# Patient Record
Sex: MALE | Race: WHITE | NOT HISPANIC OR LATINO | Employment: OTHER | URBAN - METROPOLITAN AREA
[De-identification: names, ages, dates, MRNs, and addresses within clinical notes are randomized per-mention and may not be internally consistent; named-entity substitution may affect disease eponyms.]

---

## 2017-01-04 ENCOUNTER — ALLSCRIPTS OFFICE VISIT (OUTPATIENT)
Dept: OTHER | Facility: OTHER | Age: 57
End: 2017-01-04

## 2017-01-12 ENCOUNTER — GENERIC CONVERSION - ENCOUNTER (OUTPATIENT)
Dept: OTHER | Facility: OTHER | Age: 57
End: 2017-01-12

## 2017-02-01 ENCOUNTER — GENERIC CONVERSION - ENCOUNTER (OUTPATIENT)
Dept: OTHER | Facility: OTHER | Age: 57
End: 2017-02-01

## 2017-02-02 ENCOUNTER — ALLSCRIPTS OFFICE VISIT (OUTPATIENT)
Dept: OTHER | Facility: OTHER | Age: 57
End: 2017-02-02

## 2017-02-20 ENCOUNTER — GENERIC CONVERSION - ENCOUNTER (OUTPATIENT)
Dept: OTHER | Facility: OTHER | Age: 57
End: 2017-02-20

## 2017-03-07 ENCOUNTER — ALLSCRIPTS OFFICE VISIT (OUTPATIENT)
Dept: OTHER | Facility: OTHER | Age: 57
End: 2017-03-07

## 2017-04-05 ENCOUNTER — ALLSCRIPTS OFFICE VISIT (OUTPATIENT)
Dept: OTHER | Facility: OTHER | Age: 57
End: 2017-04-05

## 2017-05-23 ENCOUNTER — ALLSCRIPTS OFFICE VISIT (OUTPATIENT)
Dept: OTHER | Facility: OTHER | Age: 57
End: 2017-05-23

## 2017-05-23 ENCOUNTER — TRANSCRIBE ORDERS (OUTPATIENT)
Dept: ADMINISTRATIVE | Facility: HOSPITAL | Age: 57
End: 2017-05-23

## 2017-05-23 DIAGNOSIS — I48.19 PERSISTENT ATRIAL FIBRILLATION (HCC): Primary | ICD-10-CM

## 2017-05-23 DIAGNOSIS — I48.91 ATRIAL FIBRILLATION (HCC): ICD-10-CM

## 2017-05-25 ENCOUNTER — LAB CONVERSION - ENCOUNTER (OUTPATIENT)
Dept: OTHER | Facility: OTHER | Age: 57
End: 2017-05-25

## 2017-05-25 LAB
A/G RATIO (HISTORICAL): 1.7 (CALC) (ref 1–2.5)
ALBUMIN SERPL BCP-MCNC: 4.1 G/DL (ref 3.6–5.1)
ALP SERPL-CCNC: 42 U/L (ref 40–115)
ALT SERPL W P-5'-P-CCNC: 16 U/L (ref 9–46)
AST SERPL W P-5'-P-CCNC: 19 U/L (ref 10–35)
BILIRUB SERPL-MCNC: 1.1 MG/DL (ref 0.2–1.2)
BUN SERPL-MCNC: 17 MG/DL (ref 7–25)
BUN/CREA RATIO (HISTORICAL): NORMAL (CALC) (ref 6–22)
CALCIUM SERPL-MCNC: 8.8 MG/DL (ref 8.6–10.3)
CHLORIDE SERPL-SCNC: 104 MMOL/L (ref 98–110)
CO2 SERPL-SCNC: 30 MMOL/L (ref 20–31)
CREAT SERPL-MCNC: 1.19 MG/DL (ref 0.7–1.33)
DEPRECATED RDW RBC AUTO: 13.9 % (ref 11–15)
EGFR AFRICAN AMERICAN (HISTORICAL): 77 ML/MIN/1.73M2
EGFR-AMERICAN CALC (HISTORICAL): 66 ML/MIN/1.73M2
GAMMA GLOBULIN (HISTORICAL): 2.4 G/DL (CALC) (ref 1.9–3.7)
GLUCOSE (HISTORICAL): 92 MG/DL (ref 65–99)
HCT VFR BLD AUTO: 44.7 % (ref 38.5–50)
HGB BLD-MCNC: 15.4 G/DL (ref 13.2–17.1)
INR PPP: 1
MCH RBC QN AUTO: 31.3 PG (ref 27–33)
MCHC RBC AUTO-ENTMCNC: 34.6 G/DL (ref 32–36)
MCV RBC AUTO: 90.5 FL (ref 80–100)
PLATELET # BLD AUTO: 195 THOUSAND/UL (ref 140–400)
PMV BLD AUTO: 8.6 FL (ref 7.5–12.5)
POTASSIUM SERPL-SCNC: 4.3 MMOL/L (ref 3.5–5.3)
PROTHROMBIN TIME: 10.8 SEC (ref 9–11.5)
RBC # BLD AUTO: 4.94 MILLION/UL (ref 4.2–5.8)
SODIUM SERPL-SCNC: 140 MMOL/L (ref 135–146)
TOTAL PROTEIN (HISTORICAL): 6.5 G/DL (ref 6.1–8.1)
WBC # BLD AUTO: 4.2 THOUSAND/UL (ref 3.8–10.8)

## 2017-05-26 ENCOUNTER — HOSPITAL ENCOUNTER (OUTPATIENT)
Dept: CT IMAGING | Facility: HOSPITAL | Age: 57
Discharge: HOME | End: 2017-05-26
Attending: INTERNAL MEDICINE
Payer: COMMERCIAL

## 2017-05-26 DIAGNOSIS — I48.19 PERSISTENT ATRIAL FIBRILLATION (HCC): ICD-10-CM

## 2017-05-26 PROCEDURE — 75572 CT HRT W/3D IMAGE: CPT

## 2017-05-26 RX ADMIN — IODIXANOL 120 ML: 320 INJECTION, SOLUTION INTRAVASCULAR at 12:58

## 2017-05-31 ENCOUNTER — ANESTHESIA EVENT (OUTPATIENT)
Dept: NON INVASIVE DIAGNOSTICS | Facility: HOSPITAL | Age: 57
End: 2017-05-31
Payer: COMMERCIAL

## 2017-05-31 RX ORDER — PANTOPRAZOLE SODIUM 40 MG/1
40 INJECTION, POWDER, FOR SOLUTION INTRAVENOUS ONCE
Status: CANCELLED | OUTPATIENT
Start: 2017-05-31 | End: 2017-05-31

## 2017-06-01 ENCOUNTER — ANESTHESIA (OUTPATIENT)
Dept: NON INVASIVE DIAGNOSTICS | Facility: HOSPITAL | Age: 57
End: 2017-06-01
Payer: COMMERCIAL

## 2017-06-01 ENCOUNTER — GENERIC CONVERSION - ENCOUNTER (OUTPATIENT)
Dept: OTHER | Facility: OTHER | Age: 57
End: 2017-06-01

## 2017-06-01 ENCOUNTER — ANESTHESIA EVENT (OUTPATIENT)
Dept: SURGERY | Facility: HOSPITAL | Age: 57
End: 2017-06-01
Payer: COMMERCIAL

## 2017-06-01 ENCOUNTER — ANESTHESIA (OUTPATIENT)
Dept: SURGERY | Facility: HOSPITAL | Age: 57
End: 2017-06-01
Payer: COMMERCIAL

## 2017-06-01 ENCOUNTER — HOSPITAL ENCOUNTER (OUTPATIENT)
Dept: NON INVASIVE DIAGNOSTICS | Facility: HOSPITAL | Age: 57
Discharge: HOME | End: 2017-06-01
Attending: INTERNAL MEDICINE | Admitting: INTERNAL MEDICINE
Payer: COMMERCIAL

## 2017-06-01 ENCOUNTER — HOSPITAL ENCOUNTER (OUTPATIENT)
Dept: NON INVASIVE DIAGNOSTICS | Facility: HOSPITAL | Age: 57
Discharge: HOME | End: 2017-06-02
Attending: INTERNAL MEDICINE | Admitting: INTERNAL MEDICINE
Payer: COMMERCIAL

## 2017-06-01 VITALS
HEART RATE: 70 BPM | OXYGEN SATURATION: 94 % | RESPIRATION RATE: 17 BRPM | SYSTOLIC BLOOD PRESSURE: 144 MMHG | HEIGHT: 71 IN | BODY MASS INDEX: 27.44 KG/M2 | TEMPERATURE: 97 F | WEIGHT: 196 LBS | DIASTOLIC BLOOD PRESSURE: 80 MMHG

## 2017-06-01 DIAGNOSIS — I48.91 ATRIAL FIBRILLATION (HCC): ICD-10-CM

## 2017-06-01 LAB
ANION GAP SERPL CALCULATED.3IONS-SCNC: 5 MMOL/L (ref 4–13)
ATRIAL RATE: 51 BPM
BASOPHILS # BLD AUTO: 0.01 THOUSANDS/ΜL (ref 0–0.1)
BASOPHILS NFR BLD AUTO: 0 % (ref 0–1)
BUN SERPL-MCNC: 17 MG/DL (ref 5–25)
CALCIUM SERPL-MCNC: 8.7 MG/DL (ref 8.3–10.1)
CHLORIDE SERPL-SCNC: 104 MMOL/L (ref 100–108)
CO2 SERPL-SCNC: 30 MMOL/L (ref 21–32)
CREAT SERPL-MCNC: 1.15 MG/DL (ref 0.6–1.3)
EOSINOPHIL # BLD AUTO: 0.19 THOUSAND/ΜL (ref 0–0.61)
EOSINOPHIL NFR BLD AUTO: 6 % (ref 0–6)
ERYTHROCYTE [DISTWIDTH] IN BLOOD BY AUTOMATED COUNT: 13 % (ref 11.6–15.1)
GFR SERPL CREATININE-BSD FRML MDRD: >60 ML/MIN/1.73SQ M
GLUCOSE P FAST SERPL-MCNC: 96 MG/DL (ref 65–99)
GLUCOSE SERPL-MCNC: 96 MG/DL (ref 65–140)
HCT VFR BLD AUTO: 44.5 % (ref 36.5–49.3)
HGB BLD-MCNC: 15.1 G/DL (ref 12–17)
KCT BLD-ACNC: 166 SEC (ref 89–137)
KCT BLD-ACNC: 242 SEC (ref 89–137)
KCT BLD-ACNC: 278 SEC (ref 89–137)
KCT BLD-ACNC: 285 SEC (ref 89–137)
KCT BLD-ACNC: 297 SEC (ref 89–137)
KCT BLD-ACNC: 322 SEC (ref 89–137)
KCT BLD-ACNC: 335 SEC (ref 89–137)
LYMPHOCYTES # BLD AUTO: 0.95 THOUSANDS/ΜL (ref 0.6–4.47)
LYMPHOCYTES NFR BLD AUTO: 27 % (ref 14–44)
MAGNESIUM SERPL-MCNC: 2.2 MG/DL (ref 1.6–2.6)
MCH RBC QN AUTO: 31.1 PG (ref 26.8–34.3)
MCHC RBC AUTO-ENTMCNC: 33.9 G/DL (ref 31.4–37.4)
MCV RBC AUTO: 92 FL (ref 82–98)
MONOCYTES # BLD AUTO: 0.31 THOUSAND/ΜL (ref 0.17–1.22)
MONOCYTES NFR BLD AUTO: 9 % (ref 4–12)
NEUTROPHILS # BLD AUTO: 2.01 THOUSANDS/ΜL (ref 1.85–7.62)
NEUTS SEG NFR BLD AUTO: 58 % (ref 43–75)
NRBC BLD AUTO-RTO: 0 /100 WBCS
P AXIS: 9 DEGREES
PLATELET # BLD AUTO: 187 THOUSANDS/UL (ref 149–390)
PMV BLD AUTO: 10.4 FL (ref 8.9–12.7)
POTASSIUM SERPL-SCNC: 4.4 MMOL/L (ref 3.5–5.3)
PR INTERVAL: 160 MS
QRS AXIS: 20 DEGREES
QRSD INTERVAL: 98 MS
QT INTERVAL: 434 MS
QTC INTERVAL: 400 MS
RBC # BLD AUTO: 4.85 MILLION/UL (ref 3.88–5.62)
SODIUM SERPL-SCNC: 139 MMOL/L (ref 136–145)
SPECIMEN SOURCE: ABNORMAL
T WAVE AXIS: -5 DEGREES
VENTRICULAR RATE: 51 BPM
WBC # BLD AUTO: 3.47 THOUSAND/UL (ref 4.31–10.16)

## 2017-06-01 PROCEDURE — 93312 ECHO TRANSESOPHAGEAL: CPT

## 2017-06-01 PROCEDURE — C1730 CATH, EP, 19 OR FEW ELECT: HCPCS | Performed by: INTERNAL MEDICINE

## 2017-06-01 PROCEDURE — 85025 COMPLETE CBC W/AUTO DIFF WBC: CPT | Performed by: PHYSICIAN ASSISTANT

## 2017-06-01 PROCEDURE — C1893 INTRO/SHEATH, FIXED,NON-PEEL: HCPCS | Performed by: INTERNAL MEDICINE

## 2017-06-01 PROCEDURE — 80048 BASIC METABOLIC PNL TOTAL CA: CPT | Performed by: PHYSICIAN ASSISTANT

## 2017-06-01 PROCEDURE — C1769 GUIDE WIRE: HCPCS | Performed by: INTERNAL MEDICINE

## 2017-06-01 PROCEDURE — 83735 ASSAY OF MAGNESIUM: CPT | Performed by: PHYSICIAN ASSISTANT

## 2017-06-01 PROCEDURE — C1894 INTRO/SHEATH, NON-LASER: HCPCS | Performed by: INTERNAL MEDICINE

## 2017-06-01 PROCEDURE — 93656 COMPRE EP EVAL ABLTJ ATR FIB: CPT | Performed by: INTERNAL MEDICINE

## 2017-06-01 PROCEDURE — 93005 ELECTROCARDIOGRAM TRACING: CPT | Performed by: PHYSICIAN ASSISTANT

## 2017-06-01 PROCEDURE — 93623 PRGRMD STIMJ&PACG IV RX NFS: CPT | Performed by: INTERNAL MEDICINE

## 2017-06-01 PROCEDURE — 93662 INTRACARDIAC ECG (ICE): CPT | Performed by: INTERNAL MEDICINE

## 2017-06-01 PROCEDURE — C9113 INJ PANTOPRAZOLE SODIUM, VIA: HCPCS | Performed by: PHYSICIAN ASSISTANT

## 2017-06-01 PROCEDURE — C1759 CATH, INTRA ECHOCARDIOGRAPHY: HCPCS | Performed by: INTERNAL MEDICINE

## 2017-06-01 PROCEDURE — 93655 ICAR CATH ABLTJ DSCRT ARRHYT: CPT | Performed by: INTERNAL MEDICINE

## 2017-06-01 PROCEDURE — C2630 CATH, EP, COOL-TIP: HCPCS | Performed by: INTERNAL MEDICINE

## 2017-06-01 PROCEDURE — 85347 COAGULATION TIME ACTIVATED: CPT

## 2017-06-01 PROCEDURE — 93613 INTRACARDIAC EPHYS 3D MAPG: CPT | Performed by: INTERNAL MEDICINE

## 2017-06-01 PROCEDURE — C1726 CATH, BAL DIL, NON-VASCULAR: HCPCS | Performed by: INTERNAL MEDICINE

## 2017-06-01 RX ORDER — SODIUM CHLORIDE 9 MG/ML
75 INJECTION, SOLUTION INTRAVENOUS CONTINUOUS
Status: DISCONTINUED | OUTPATIENT
Start: 2017-06-01 | End: 2017-06-02 | Stop reason: HOSPADM

## 2017-06-01 RX ORDER — SODIUM CHLORIDE, SODIUM LACTATE, POTASSIUM CHLORIDE, CALCIUM CHLORIDE 600; 310; 30; 20 MG/100ML; MG/100ML; MG/100ML; MG/100ML
20 INJECTION, SOLUTION INTRAVENOUS CONTINUOUS
Status: CANCELLED | OUTPATIENT
Start: 2017-06-01

## 2017-06-01 RX ORDER — ZINC GLUCONATE 50 MG
25 TABLET ORAL DAILY
COMMUNITY
End: 2018-08-10 | Stop reason: ALTCHOICE

## 2017-06-01 RX ORDER — SODIUM CHLORIDE, SODIUM LACTATE, POTASSIUM CHLORIDE, CALCIUM CHLORIDE 600; 310; 30; 20 MG/100ML; MG/100ML; MG/100ML; MG/100ML
20 INJECTION, SOLUTION INTRAVENOUS CONTINUOUS
Status: DISCONTINUED | OUTPATIENT
Start: 2017-06-01 | End: 2017-06-02 | Stop reason: HOSPADM

## 2017-06-01 RX ORDER — ASPIRIN 81 MG/1
162 TABLET ORAL DAILY
Status: DISCONTINUED | OUTPATIENT
Start: 2017-06-02 | End: 2017-06-02 | Stop reason: HOSPADM

## 2017-06-01 RX ORDER — SODIUM CHLORIDE 9 MG/ML
INJECTION, SOLUTION INTRAVENOUS CONTINUOUS PRN
Status: DISCONTINUED | OUTPATIENT
Start: 2017-06-01 | End: 2017-06-01 | Stop reason: SURG

## 2017-06-01 RX ORDER — EPHEDRINE SULFATE 50 MG/ML
INJECTION, SOLUTION INTRAVENOUS AS NEEDED
Status: DISCONTINUED | OUTPATIENT
Start: 2017-06-01 | End: 2017-06-01 | Stop reason: SURG

## 2017-06-01 RX ORDER — PANTOPRAZOLE SODIUM 40 MG/1
40 TABLET, DELAYED RELEASE ORAL
Status: DISCONTINUED | OUTPATIENT
Start: 2017-06-02 | End: 2017-06-02 | Stop reason: HOSPADM

## 2017-06-01 RX ORDER — ONDANSETRON 2 MG/ML
4 INJECTION INTRAMUSCULAR; INTRAVENOUS ONCE
Status: DISCONTINUED | OUTPATIENT
Start: 2017-06-01 | End: 2017-06-01 | Stop reason: HOSPADM

## 2017-06-01 RX ORDER — HEPARIN SODIUM 1000 [USP'U]/ML
INJECTION, SOLUTION INTRAVENOUS; SUBCUTANEOUS CODE/TRAUMA/SEDATION MEDICATION
Status: COMPLETED | OUTPATIENT
Start: 2017-06-01 | End: 2017-06-01

## 2017-06-01 RX ORDER — PROTAMINE SULFATE 10 MG/ML
INJECTION, SOLUTION INTRAVENOUS AS NEEDED
Status: DISCONTINUED | OUTPATIENT
Start: 2017-06-01 | End: 2017-06-01 | Stop reason: SURG

## 2017-06-01 RX ORDER — ATROPINE SULFATE 0.4 MG/ML
AMPUL (ML) INJECTION AS NEEDED
Status: DISCONTINUED | OUTPATIENT
Start: 2017-06-01 | End: 2017-06-01 | Stop reason: SURG

## 2017-06-01 RX ORDER — LIDOCAINE HYDROCHLORIDE 10 MG/ML
INJECTION, SOLUTION INFILTRATION; PERINEURAL AS NEEDED
Status: DISCONTINUED | OUTPATIENT
Start: 2017-06-01 | End: 2017-06-01 | Stop reason: SURG

## 2017-06-01 RX ORDER — FENTANYL CITRATE 50 UG/ML
INJECTION, SOLUTION INTRAMUSCULAR; INTRAVENOUS AS NEEDED
Status: DISCONTINUED | OUTPATIENT
Start: 2017-06-01 | End: 2017-06-01 | Stop reason: SURG

## 2017-06-01 RX ORDER — PANTOPRAZOLE SODIUM 40 MG/1
40 INJECTION, POWDER, FOR SOLUTION INTRAVENOUS ONCE
Status: COMPLETED | OUTPATIENT
Start: 2017-06-01 | End: 2017-06-01

## 2017-06-01 RX ORDER — ADENOSINE 3 MG/ML
INJECTION INTRAVENOUS CODE/TRAUMA/SEDATION MEDICATION
Status: COMPLETED | OUTPATIENT
Start: 2017-06-01 | End: 2017-06-01

## 2017-06-01 RX ORDER — MULTIVIT-MIN/FA/LYCOPEN/LUTEIN .4-300-25
1 TABLET ORAL
COMMUNITY

## 2017-06-01 RX ORDER — HEPARIN SODIUM 10000 [USP'U]/100ML
INJECTION, SOLUTION INTRAVENOUS
Status: COMPLETED | OUTPATIENT
Start: 2017-06-01 | End: 2017-06-01

## 2017-06-01 RX ORDER — MIDAZOLAM HYDROCHLORIDE 1 MG/ML
INJECTION INTRAMUSCULAR; INTRAVENOUS AS NEEDED
Status: DISCONTINUED | OUTPATIENT
Start: 2017-06-01 | End: 2017-06-01 | Stop reason: SURG

## 2017-06-01 RX ORDER — DABIGATRAN ETEXILATE 150 MG/1
150 CAPSULE, COATED PELLETS ORAL 2 TIMES DAILY
COMMUNITY
End: 2018-08-10 | Stop reason: ALTCHOICE

## 2017-06-01 RX ORDER — FENTANYL CITRATE/PF 50 MCG/ML
25 SYRINGE (ML) INJECTION
Status: CANCELLED | OUTPATIENT
Start: 2017-06-01

## 2017-06-01 RX ORDER — ACETAMINOPHEN 325 MG/1
650 TABLET ORAL EVERY 4 HOURS PRN
Status: DISCONTINUED | OUTPATIENT
Start: 2017-06-01 | End: 2017-06-02 | Stop reason: HOSPADM

## 2017-06-01 RX ORDER — DABIGATRAN ETEXILATE 150 MG/1
150 CAPSULE, COATED PELLETS ORAL 2 TIMES DAILY
Status: DISCONTINUED | OUTPATIENT
Start: 2017-06-01 | End: 2017-06-02 | Stop reason: HOSPADM

## 2017-06-01 RX ORDER — ONDANSETRON 2 MG/ML
4 INJECTION INTRAMUSCULAR; INTRAVENOUS ONCE
Status: CANCELLED | OUTPATIENT
Start: 2017-06-01 | End: 2017-06-01

## 2017-06-01 RX ORDER — FENTANYL CITRATE/PF 50 MCG/ML
50 SYRINGE (ML) INJECTION
Status: DISCONTINUED | OUTPATIENT
Start: 2017-06-01 | End: 2017-06-01 | Stop reason: HOSPADM

## 2017-06-01 RX ORDER — PROPOFOL 10 MG/ML
INJECTION, EMULSION INTRAVENOUS AS NEEDED
Status: DISCONTINUED | OUTPATIENT
Start: 2017-06-01 | End: 2017-06-01 | Stop reason: SURG

## 2017-06-01 RX ORDER — ROCURONIUM BROMIDE 10 MG/ML
INJECTION, SOLUTION INTRAVENOUS AS NEEDED
Status: DISCONTINUED | OUTPATIENT
Start: 2017-06-01 | End: 2017-06-01 | Stop reason: SURG

## 2017-06-01 RX ORDER — FENTANYL CITRATE/PF 50 MCG/ML
25 SYRINGE (ML) INJECTION
Status: DISCONTINUED | OUTPATIENT
Start: 2017-06-01 | End: 2017-06-01 | Stop reason: HOSPADM

## 2017-06-01 RX ADMIN — Medication 1 TABLET: at 17:02

## 2017-06-01 RX ADMIN — HEPARIN SODIUM 4000 UNITS: 1000 INJECTION INTRAVENOUS; SUBCUTANEOUS at 09:54

## 2017-06-01 RX ADMIN — ROCURONIUM BROMIDE 40 MG: 10 INJECTION, SOLUTION INTRAVENOUS at 07:56

## 2017-06-01 RX ADMIN — PROTAMINE SULFATE 45 MG: 10 INJECTION, SOLUTION INTRAVENOUS at 11:06

## 2017-06-01 RX ADMIN — FENTANYL CITRATE 100 MCG: 50 INJECTION, SOLUTION INTRAMUSCULAR; INTRAVENOUS at 07:55

## 2017-06-01 RX ADMIN — IOHEXOL 30 ML: 350 INJECTION, SOLUTION INTRAVENOUS at 11:07

## 2017-06-01 RX ADMIN — MIDAZOLAM HYDROCHLORIDE 2 MG: 1 INJECTION, SOLUTION INTRAMUSCULAR; INTRAVENOUS at 07:46

## 2017-06-01 RX ADMIN — HEPARIN SODIUM 2000 UNITS: 1000 INJECTION INTRAVENOUS; SUBCUTANEOUS at 09:34

## 2017-06-01 RX ADMIN — DEXTROSE 0.5 MCG/MIN: 5 SOLUTION INTRAVENOUS at 09:28

## 2017-06-01 RX ADMIN — PANTOPRAZOLE SODIUM 40 MG: 40 INJECTION, POWDER, FOR SOLUTION INTRAVENOUS at 09:49

## 2017-06-01 RX ADMIN — SODIUM CHLORIDE: 0.9 INJECTION, SOLUTION INTRAVENOUS at 07:43

## 2017-06-01 RX ADMIN — ADENOSINE 18 MG: 3 INJECTION, SOLUTION INTRAVENOUS at 10:56

## 2017-06-01 RX ADMIN — LIDOCAINE HYDROCHLORIDE 50 MG: 10 INJECTION, SOLUTION INFILTRATION; PERINEURAL at 07:55

## 2017-06-01 RX ADMIN — HEPARIN SODIUM 5000 UNITS: 1000 INJECTION INTRAVENOUS; SUBCUTANEOUS at 09:05

## 2017-06-01 RX ADMIN — DABIGATRAN ETEXILATE MESYLATE 150 MG: 150 CAPSULE ORAL at 18:15

## 2017-06-01 RX ADMIN — HEPARIN SODIUM 9000 UNITS: 1000 INJECTION INTRAVENOUS; SUBCUTANEOUS at 08:45

## 2017-06-01 RX ADMIN — EPHEDRINE SULFATE 10 MG: 50 INJECTION, SOLUTION INTRAMUSCULAR; INTRAVENOUS; SUBCUTANEOUS at 09:27

## 2017-06-01 RX ADMIN — DEXAMETHASONE SODIUM PHOSPHATE 10 MG: 10 INJECTION INTRAMUSCULAR; INTRAVENOUS at 08:40

## 2017-06-01 RX ADMIN — ATROPINE SULFATE 0.4 MG: 0.4 INJECTION, SOLUTION INTRAMUSCULAR; INTRAVENOUS; SUBCUTANEOUS at 09:28

## 2017-06-01 RX ADMIN — HEPARIN SODIUM 2500 UNITS/HR: 10000 INJECTION, SOLUTION INTRAVENOUS at 08:47

## 2017-06-01 RX ADMIN — PROPOFOL 200 MG: 10 INJECTION, EMULSION INTRAVENOUS at 07:55

## 2017-06-01 RX ADMIN — SODIUM CHLORIDE: 0.9 INJECTION, SOLUTION INTRAVENOUS at 08:02

## 2017-06-02 VITALS
RESPIRATION RATE: 17 BRPM | OXYGEN SATURATION: 97 % | BODY MASS INDEX: 27.44 KG/M2 | TEMPERATURE: 98.2 F | SYSTOLIC BLOOD PRESSURE: 141 MMHG | WEIGHT: 195.99 LBS | HEIGHT: 71 IN | HEART RATE: 64 BPM | DIASTOLIC BLOOD PRESSURE: 84 MMHG

## 2017-06-02 LAB
ANION GAP SERPL CALCULATED.3IONS-SCNC: 6 MMOL/L (ref 4–13)
BUN SERPL-MCNC: 14 MG/DL (ref 5–25)
CALCIUM SERPL-MCNC: 8.6 MG/DL (ref 8.3–10.1)
CHLORIDE SERPL-SCNC: 104 MMOL/L (ref 100–108)
CO2 SERPL-SCNC: 28 MMOL/L (ref 21–32)
CREAT SERPL-MCNC: 1.05 MG/DL (ref 0.6–1.3)
ERYTHROCYTE [DISTWIDTH] IN BLOOD BY AUTOMATED COUNT: 13 % (ref 11.6–15.1)
GFR SERPL CREATININE-BSD FRML MDRD: >60 ML/MIN/1.73SQ M
GLUCOSE SERPL-MCNC: 103 MG/DL (ref 65–140)
HCT VFR BLD AUTO: 41.2 % (ref 36.5–49.3)
HGB BLD-MCNC: 13.9 G/DL (ref 12–17)
MAGNESIUM SERPL-MCNC: 2.1 MG/DL (ref 1.6–2.6)
MCH RBC QN AUTO: 31.2 PG (ref 26.8–34.3)
MCHC RBC AUTO-ENTMCNC: 33.7 G/DL (ref 31.4–37.4)
MCV RBC AUTO: 93 FL (ref 82–98)
PLATELET # BLD AUTO: 175 THOUSANDS/UL (ref 149–390)
PMV BLD AUTO: 10.5 FL (ref 8.9–12.7)
POTASSIUM SERPL-SCNC: 4.2 MMOL/L (ref 3.5–5.3)
RBC # BLD AUTO: 4.45 MILLION/UL (ref 3.88–5.62)
SODIUM SERPL-SCNC: 138 MMOL/L (ref 136–145)
WBC # BLD AUTO: 8.13 THOUSAND/UL (ref 4.31–10.16)

## 2017-06-02 PROCEDURE — 85027 COMPLETE CBC AUTOMATED: CPT | Performed by: PHYSICIAN ASSISTANT

## 2017-06-02 PROCEDURE — 80048 BASIC METABOLIC PNL TOTAL CA: CPT | Performed by: PHYSICIAN ASSISTANT

## 2017-06-02 PROCEDURE — 83735 ASSAY OF MAGNESIUM: CPT | Performed by: PHYSICIAN ASSISTANT

## 2017-06-02 RX ORDER — PANTOPRAZOLE SODIUM 40 MG/1
40 TABLET, DELAYED RELEASE ORAL
Qty: 30 TABLET | Refills: 0 | Status: SHIPPED | OUTPATIENT
Start: 2017-06-02 | End: 2018-08-10 | Stop reason: ALTCHOICE

## 2017-06-02 RX ADMIN — PANTOPRAZOLE SODIUM 40 MG: 40 TABLET, DELAYED RELEASE ORAL at 05:16

## 2017-06-02 RX ADMIN — ASPIRIN 162 MG: 81 TABLET ORAL at 09:13

## 2017-06-02 RX ADMIN — Medication 1 TABLET: at 09:13

## 2017-06-02 RX ADMIN — DABIGATRAN ETEXILATE MESYLATE 150 MG: 150 CAPSULE ORAL at 10:18

## 2017-06-02 RX ADMIN — BENAZEPRIL HYDROCHLORIDE: 10 TABLET ORAL at 09:13

## 2017-06-19 ENCOUNTER — ALLSCRIPTS OFFICE VISIT (OUTPATIENT)
Dept: OTHER | Facility: OTHER | Age: 57
End: 2017-06-19

## 2017-07-06 ENCOUNTER — ALLSCRIPTS OFFICE VISIT (OUTPATIENT)
Dept: OTHER | Facility: OTHER | Age: 57
End: 2017-07-06

## 2017-07-27 ENCOUNTER — GENERIC CONVERSION - ENCOUNTER (OUTPATIENT)
Dept: OTHER | Facility: OTHER | Age: 57
End: 2017-07-27

## 2017-07-28 ENCOUNTER — GENERIC CONVERSION - ENCOUNTER (OUTPATIENT)
Dept: OTHER | Facility: OTHER | Age: 57
End: 2017-07-28

## 2017-08-07 ENCOUNTER — ALLSCRIPTS OFFICE VISIT (OUTPATIENT)
Dept: OTHER | Facility: OTHER | Age: 57
End: 2017-08-07

## 2017-08-14 ENCOUNTER — GENERIC CONVERSION - ENCOUNTER (OUTPATIENT)
Dept: OTHER | Facility: OTHER | Age: 57
End: 2017-08-14

## 2017-09-08 ENCOUNTER — ALLSCRIPTS OFFICE VISIT (OUTPATIENT)
Dept: OTHER | Facility: OTHER | Age: 57
End: 2017-09-08

## 2017-09-08 DIAGNOSIS — Z12.5 ENCOUNTER FOR SCREENING FOR MALIGNANT NEOPLASM OF PROSTATE: ICD-10-CM

## 2017-09-08 DIAGNOSIS — F52.21 MALE ERECTILE DISORDER (CODE): ICD-10-CM

## 2017-09-08 DIAGNOSIS — Z00.00 ENCOUNTER FOR GENERAL ADULT MEDICAL EXAMINATION WITHOUT ABNORMAL FINDINGS: ICD-10-CM

## 2017-09-08 DIAGNOSIS — I10 ESSENTIAL (PRIMARY) HYPERTENSION: ICD-10-CM

## 2017-09-14 ENCOUNTER — LAB CONVERSION - ENCOUNTER (OUTPATIENT)
Dept: OTHER | Facility: OTHER | Age: 57
End: 2017-09-14

## 2017-09-14 ENCOUNTER — GENERIC CONVERSION - ENCOUNTER (OUTPATIENT)
Dept: OTHER | Facility: OTHER | Age: 57
End: 2017-09-14

## 2017-09-14 LAB
A/G RATIO (HISTORICAL): 1.8 (CALC) (ref 1–2.5)
ALBUMIN SERPL BCP-MCNC: 4.2 G/DL (ref 3.6–5.1)
ALP SERPL-CCNC: 42 U/L (ref 40–115)
ALT SERPL W P-5'-P-CCNC: 15 U/L (ref 9–46)
AST SERPL W P-5'-P-CCNC: 23 U/L (ref 10–35)
BILIRUB SERPL-MCNC: 1 MG/DL (ref 0.2–1.2)
BUN SERPL-MCNC: 17 MG/DL (ref 7–25)
BUN/CREA RATIO (HISTORICAL): NORMAL (CALC) (ref 6–22)
CALCIUM SERPL-MCNC: 9.1 MG/DL (ref 8.6–10.3)
CHLORIDE SERPL-SCNC: 102 MMOL/L (ref 98–110)
CHOLEST SERPL-MCNC: 187 MG/DL
CHOLEST/HDLC SERPL: 3.5 (CALC)
CO2 SERPL-SCNC: 28 MMOL/L (ref 20–31)
CREAT SERPL-MCNC: 1.14 MG/DL (ref 0.7–1.33)
EGFR AFRICAN AMERICAN (HISTORICAL): 81 ML/MIN/1.73M2
EGFR-AMERICAN CALC (HISTORICAL): 70 ML/MIN/1.73M2
GAMMA GLOBULIN (HISTORICAL): 2.4 G/DL (CALC) (ref 1.9–3.7)
GLUCOSE (HISTORICAL): 87 MG/DL (ref 65–99)
HBA1C MFR BLD HPLC: 5.3 % OF TOTAL HGB
HDLC SERPL-MCNC: 54 MG/DL
HEPATITIS C ANTIBODY (HISTORICAL): NORMAL
LDL CHOLESTEROL (HISTORICAL): 115 MG/DL (CALC)
NON-HDL-CHOL (CHOL-HDL) (HISTORICAL): 133 MG/DL (CALC)
POTASSIUM SERPL-SCNC: 4.1 MMOL/L (ref 3.5–5.3)
PROSTATE SPECIFIC ANTIGEN TOTAL (HISTORICAL): 0.6 NG/ML
SIGNAL TO CUT-OFF (HISTORICAL): 0.01
SODIUM SERPL-SCNC: 137 MMOL/L (ref 135–146)
TESTOSTERONE FREE (HISTORICAL): 93.6 PG/ML (ref 35–155)
TESTOSTERONE TOTAL (HISTORICAL): 650 NG/DL (ref 250–1100)
TOTAL PROTEIN (HISTORICAL): 6.6 G/DL (ref 6.1–8.1)
TRIGL SERPL-MCNC: 83 MG/DL

## 2017-09-25 ENCOUNTER — GENERIC CONVERSION - ENCOUNTER (OUTPATIENT)
Dept: OTHER | Facility: OTHER | Age: 57
End: 2017-09-25

## 2017-10-19 ENCOUNTER — ALLSCRIPTS OFFICE VISIT (OUTPATIENT)
Dept: OTHER | Facility: OTHER | Age: 57
End: 2017-10-19

## 2017-10-26 NOTE — PROGRESS NOTES
Assessment  Assessed    1  Atrial fibrillation (427 31) (I48 91)   2  Benign essential hypertension (401 1) (I10)   3  Chest discomfort (786 59) (R01 89)    Plan  Atrial fibrillation    · Pradaxa 150 MG Oral Capsule   Rx By: Zainab Mohr; Dispense: 0 Days ; #: Sufficient Capsule; Refill: 0;For: Atrial fibrillation; EDISON = N; Record; Last Updated By: Saumya Alcantar; 9/8/2017 4:59:03 PM  Atrial fibrillation, Typical atrial flutter    · EKG/ECG- POC; Status:Complete;   Done: 03MGU8086   Perform: In Office; [phone]; Last Updated By:Candelaria Serrano; 9/8/2017 4:40:37 PM;Ordered; For:Atrial fibrillation, Typical atrial flutter; Ordered By:Jessica Padron;    Discussion/Summary  Cardiology Discussion Summary Free Text Note Form [facility]:   yo maleS/p successful ab  Great exercise tolerance  Zero afib episodes since June (0% now)> He was at 21%- up to 27 9% prior to ablation and in typical flutterAnticoagualted on Pradaxa post ablation [id number]=3  HTN well controlled on amlodipine  CHest pressue during long hike on 8/12/17  Since then has done exercise w/o limitations  No Q waves on EKGNarrow complex rhythm 188bpm on loop may represent SVT vs SInus tach 8/12/17 but was at 4:30pm not related to his chest pain episdoe in morning  He was asymtpomatic at that time  Cannot r/o SVT vs sinus tach, but I did not see P waves on loop  We did not induce SVT during ablation  D/c Pradaxaf/u in 12 monthsforward to Dr Velasquez [place] regarding the Chest pressure if he thinks imaging worth while  in 3 months  Chief Complaint  Chief Complaint Free Text Note Form: f/u afib      History of Present Illness  Cardiology HPI Free Text Note [facility]: yo maleS/p successful ab  Great exercise tolerance  Zero afib episodes since June (0% now)> He was at 21%- up to 27 9% prior to ablation and in typical flutterAnticoagualted on Pradaxa post ablation [id number]=2  HTN well controlled on amlodipine   CHest pressue during long hike on 8/12/17  Since then has done exercise w/o limitations  No Q waves on EKGNarrow complex rhythm 188bpm on loop may represent SVT vs SInus tach 8/12/17 but was at 4:30pm not related to his chest pain episdoe in morning  He was asymtpomatic at that time  Cannot r/o SVT vs sinus tach, but I did not see P waves on loop  We did not induce SVT during ablation  point ROS reviewed in paper chart       Review of Systems  Cardiology Male ROS:     Cardiac: as noted in HPI  Skin: No complaints of nonhealing sores or skin rash  Genitourinary: No complaints of recurrent urinary tract infections, frequent urination at night, difficult urination, blood in urine, kidney stones, loss of bladder control, no kidney or prostate problems, no erectile dysfunction  Psychological: No complaints of feeling depressed, anxiety, panic attacks, or difficulty concentrating  General: No complaints of trouble sleeping, lack of energy, fatigue, appetite changes, weight changes, fever, frequent infections, or night sweats  Respiratory: No complaints of shortness of breath, cough with sputum, or wheezing  HEENT: No complaints of serious problems, hearing problems, nose problems, throat problems, or snoring  Gastrointestinal: No complaints of liver problems, nausea, vomiting, heartburn, constipation, bloody stools, diarrhea, problems swallowing, adbominal pain, or rectal bleeding  Hematologic: No complaints of bleeding disorders, anemia, blood clots, or excessive brusing  Neurological: No complaints of numbness, tingling, dizziness, weakness, seizures, headaches, syncope or fainting, AM fatigue, daytime sleepiness, no witnessed apnea episodes  Musculoskeletal: No complaints of arthritis, back pain, or painfull swelling  Active Problems  Problems    1  Abnormal lung sounds (786 7) (R09 89)   2  Atrial fibrillation (427 31) (I48 91)   3  Benign essential hypertension (401 1) (I10)   4  Catheter Ablation Atrial Fibrillation   5  Catheter Ablation Atrial Flutter   6  Encounter for prostate cancer screening (V76 44) (Z12 5)   7  Erectile dysfunction of non-organic origin (302 72) (F52 21)   8  Neuritis (729 2) (M79 2)   9  Onychomycosis (110 1) (B35 1)   10  Paroxysmal atrial fibrillation (427 31) (I48 0)   11  Typical atrial flutter (427 32) (I48 3)    Past Medical History  Problems    1  History of hypertension (V12 59) (Z86 79)   2  History of irregular heartbeat (V12 59) (Z86 79)   3  History of Overweight (278 02) (E66 3)  Active Problems And Past Medical History Reviewed: The active problems and past medical history were reviewed and updated today  Surgical History  Problems    1  History of Inguinal Hernia Repair   2  History of Oral Surgery Tooth Extraction   3  History of Tonsillectomy  Surgical History Reviewed: The surgical history was reviewed and updated today  Family History  Father    1  Family history of Heart Disease (V17 49)  Family History    2  Family history of Family Health Status Of Father -    3  Family history of Family Health Status Of Mother - Alive  Family History Reviewed: The family history was reviewed and updated today  Social History  Problems    · Being A Social Drinker   · Denied: History of Drug Use   · Has 3 children   ·    · Never a smoker   · Non smoker / tobacco user (V49 89) (Z78 9)  Social History Reviewed: The social history was reviewed and updated today  Current Meds   1  Amlodipine Besy-Benazepril HCl - 5-10 MG Oral Capsule; TAKE ONE CAPSULE BY   MOUTH EVERY DAY; Therapy: 54JOG3469 to (Evaluate:07Dnl5607)  Requested for: 09Bdz3002; Last   Rx:[id number] Ordered   2  Multi-Vitamin Oral Tablet; TAKE 1 TABLET DAILY; Therapy: (Recorded:2017) to Recorded   3  Pradaxa 150 MG Oral Capsule; TAKE 1 CAPSULE TWICE DAILY; Therapy: 37CHX2415 to Recorded   4  PreserVision AREDS 2 CAPS; take 1 capsule daily; Therapy: (Recorded:2017) to Recorded   5   Soya Lecithin 1200 MG Oral Capsule; take 1 capsule daily; Therapy: 09DTB2177 to Recorded  Medication List Reviewed: The medication list was reviewed and updated today  Allergies  Medication    1  No Known Drug Allergies  Non-Medication    2  No Known Environmental Allergies   3  No Known Food Allergies    Vitals  Vital Signs    Recorded: 08Sep2017 04:39PM   Heart Rate 73   Systolic 019, RUE, Sitting   Diastolic 76, RUE, Sitting   Height 6 ft    Weight 190 lb 9 oz   BMI Calculated 25 85   BSA Calculated 2 09     Physical Exam    Constitutional - General appearance: No acute distress, well appearing and well nourished  Eyes - Conjunctiva and Sclera examination: Conjunctiva pink, sclera anicteric  Ears, Nose, Mouth, and Throat - External inspection of ears and nose: Normal without deformities or discharge  Neck - Normal, no JVD   Pulmonary - Respiratory effort: No signs of respiratory distress  -- Auscultation of lungs: Clear to auscultation  Cardiovascular - Auscultation of heart: Normal rate and rhythm, normal S1 and S2, no murmurs  -- Pedal pulses: Normal, 2+ bilaterally  -- Examination of extremities for edema and/or varicosities: Normal     Chest - Chest: Normal    Musculoskeletal - Gait and station: Normal gait  Skin - Skin: Normal without rashes  Skin is warm and well perfused  Neurologic - Speech normal  No focal deficits  Psychiatric - Orientation to person, place, and time: Normal    Additional Findings - loop recoder site c/d/i  Results/Data  Diagnostic Studies Reviewed Cardio:   Holter/Event Recorder: I interogated loop today  No afib eipsodes since June 2017  One SVT episode 8/12/17 see discussion  ECG Report: today nsr 71bpmvisit: Typical aflutter w RVR 133bpm 2:1EKGnormal EKG today except possible LAE  EKG from yesterday which showed rate controlled afib        Future Appointments    Date/Time Provider Specialty Site   10/18/2017 08:30 AM Cardiology, Device Remote  [facility] [address]   01/23/2018 09:30 AM Cardiology, Device Remote   [address]   04/24/2018 09:00 AM Cardiology, Device Remote   [address]     Signatures   Electronically signed by : NIURKA Carranza ; Sep  8 2017  5:12PM EST                       (Author)

## 2018-01-10 NOTE — RESULT NOTES
Message   Notify the patient normal lipid panel follow-up as scheduled   Notify the patient normal comprehensive metabolic panel follow up as scheduled        Verified Results  (1) LIPID PANEL, FASTING [id number] 07:12AM Lurdes Clancy     Test Name Result Flag Reference   CHOLESTEROL, TOTAL 187 mg/dL  <200   HDL CHOLESTEROL 54 mg/dL  >09   TRIGLICERIDES 83 mg/dL  <806   LDL-CHOLESTEROL 115 mg/dL (calc) H    Reference range: <100     Desirable range <100 mg/dL for patients with CHD or  diabetes and <70 mg/dL for diabetic patients with  known heart disease  LDL-C is now calculated using the Gilbert-Zhu   calculation, which is a validated novel method providing   better accuracy than the Friedewald equation in the   estimation of LDL-C  Valinda Boast et al [facility]  2741;030(70): [phone]   (http://Boomrat/faq/NMN731)   CHOL/HDLC RATIO 3 5 (calc)  <5 0   NON HDL CHOLESTEROL 133 mg/dL (calc) H <130   For patients with diabetes plus 1 major ASCVD risk   factor, treating to a non-HDL-C goal of <100 mg/dL   (LDL-C of <70 mg/dL) is considered a therapeutic   option  (1) COMPREHENSIVE METABOLIC PANEL 93LMK7261 11:53NS Lurdes Clancy     Test Name Result Flag Reference   GLUCOSE 87 mg/dL  65-99   Fasting reference interval   UREA NITROGEN (BUN) 17 mg/dL  7-25   CREATININE 1 14 mg/dL  0 70-1 33   For patients >52years of age, the reference limit  for Creatinine is approximately 13% higher for people  identified as -American  eGFR NON-AFR   AMERICAN 70 mL/min/1 73m2  > OR = 60   eGFR AFRICAN AMERICAN 81 mL/min/1 73m2  > OR = 60   BUN/CREATININE RATIO   2-72   NOT APPLICABLE (calc)   SODIUM 137 mmol/L  135-146   POTASSIUM 4 1 mmol/L  3 5-5 3   CHLORIDE 102 mmol/L     CARBON DIOXIDE 28 mmol/L  20-31   CALCIUM 9 1 mg/dL  8 6-10 3   PROTEIN, TOTAL 6 6 g/dL  6 1-8 1   ALBUMIN 4 2 g/dL  3 6-5 1   GLOBULIN 2 4 g/dL (calc)  1 9-3 7   ALBUMIN/GLOBULIN RATIO 1 8 (calc)  1 0-2 5 BILIRUBIN, TOTAL 1 0 mg/dL  0 2-1 2   ALKALINE PHOSPHATASE 42 U/L     AST 23 U/L  10-35   ALT 15 U/L  9-46       Signatures   Electronically signed by : Igor Leslie DO; Sep 14 2017  5:37PM EST                       (Author)

## 2018-01-10 NOTE — PROGRESS NOTES
Assessment    1  Never a smoker   2  Encounter for preventive health examination (V70 0) (Z00 00)   3  Erectile dysfunction of non-organic origin (302 72) (F52 21)   4  Neuritis (729 2) (M79 2)    Assessment and plan #1 annual wellness examination completed for the patient overall the patient is clinically stable and doing very well weaning perturbation of all healthy and balanced diet I'll be ordering the patient's comprehensive metabolic panel, hemoglobin A1c, hepatitis C antibody and lipid panel  #2 erectile dysfunction decreased muscle recovery will check free and total testosterone No  3 upper back neuronitis likely irritation of the sensory nerve for a mild he reports to me a burning sensation of the skin but there is no rash it is intermittent he has had it since December the patient see neurologist Dr August Garcia  I recommend a flu vaccine in the fall RTO in one year or call if any problems  Patient will go for a colonoscopy in October  Plan  Benign essential hypertension, Health Maintenance, Erectile dysfunction of non-organic  origin    · (1) LIPID PANEL, FASTING; Status:Active; Requested SRI:[id number]; Health Maintenance, Encounter for prostate cancer screening, Erectile dysfunction of  non-organic origin    · (1) PSA (SCREEN) (Dx V76 44 Screen for Prostate Cancer); Status:Active; Requested  CKR:23COT2160; Health Maintenance, Erectile dysfunction of non-organic origin    · (1) COMPREHENSIVE METABOLIC PANEL; Status:Active; Requested MZJ:84IMZ5427;    · (1) HEMOGLOBIN A1C; Status:Active; Requested VFW:56OSH4955;    · (1) HEP C ANTIBODY; Status:Active; Requested DBQ:46DFN3546;    · (1) TESTOSTERONE, FREE (DIRECT) AND TOTAL; Status:Active;  Requested  HYZ:91QKF1456;   Neuritis    · 1 - Henry LICONA, Dane Valle  Neurology Co-Management  *  Status: Active  Requested  for: 14MOD9333  Care Summary provided  : Yes    Chief Complaint  Patient is here for a yearly wellness exam       History of Present Illness  HM, Adult Male: The patient is being seen for a health maintenance evaluation  Social History: Household members include spouse  He is   Work status: working full time and occupation: Sales  The patient has never smoked cigarettes  He reports rare alcohol use and drinking 4 drinks per week  He has never used illicit drugs  General Health: The patient's health since the last visit is described as good  He has regular dental visits  The patient brushes 2 time(s) a day, flosses 1 time(s) a day and reports his last dental visit was Q6months  He complains of vision problems  Vision care includes an eye examination within the last year  He denies hearing loss  Lifestyle:  He consumes a diverse and healthy diet  (salads , smoothie , fruits , chicken , flax , chi , kale)   He does not have any weight concerns  He exercises regularly  He exercises 3 or more times per week for 45 minutes per session  Exercise includes Running  He uses tobacco  He consumes alcohol  He reports occasional alcohol use and drinking 4 drinks per week  He uses illicit drugs  Reproductive health:  the patient is sexually active  birth control is not being practiced  He complains of erectile dysfunction  Screening: (sept Dr Sameer Mendez )  Safety elements used: seat belt, safe driving habits, sunscreen, smoke detector, CPR training for the patient and CPR training for household members  Risk findings: no passive smoke exposure, no anxiety symptoms, no depression symptoms, no travel to developing areas and no tuberculosis exposure  HPI: left upper back burning sensation intermittent never in the am since dec not increasing lasting min spontaneously goes away; difficulty with muscle fatigue and recovery , no decreased libido difficulty with maintaining Patient also reports any a patch of numbness and tingling on his left upper back is been intermittent not exertional last for minutes up to 30 minutes since December   No rash Active Problems    1  Abnormal lung sounds (786 7) (R09 89)   2  Atrial fibrillation (427 31) (I48 91)   3  Benign essential hypertension (401 1) (I10)   4  Catheter Ablation Atrial Fibrillation   5  Catheter Ablation Atrial Flutter   6  Erectile dysfunction of non-organic origin (302 72) (F52 21)   7  Onychomycosis (110 1) (B35 1)   8  Paroxysmal atrial fibrillation (427 31) (I48 0)   9  Typical atrial flutter (427 32) (I48 3)    Past Medical History    · History of hypertension (V12 59) (Z86 79)   · History of irregular heartbeat (V12 59) (Z86 79)   · History of Overweight (278 02) (E66 3)    Surgical History    · History of Inguinal Hernia Repair   · History of Oral Surgery Tooth Extraction   · History of Tonsillectomy    Family History  Father    · Family history of Heart Disease (V17 49)  Family History    · Family history of Family Health Status Of Father -    · Family history of Family Health Status Of Mother - Alive    Social History    · Being A Social Drinker   · Denied: History of Drug Use   · Has 3 children   ·    · Never a smoker   · Non smoker / tobacco user (V49 89) (Z78 9)    Current Meds   1  Amlodipine Besy-Benazepril HCl - 5-10 MG Oral Capsule; TAKE ONE CAPSULE BY   MOUTH EVERY DAY; Therapy: 79YBJ1415 to (Evaluate:11Zrr5848)  Requested for: 19Vma6958; Last   Rx:[id number] Ordered   2  Multi-Vitamin Oral Tablet; TAKE 1 TABLET DAILY; Therapy: (Recorded:2017) to Recorded   3  Pradaxa 150 MG Oral Capsule; TAKE 1 CAPSULE TWICE DAILY; Therapy: 17MAB6378 to Recorded   4  PreserVision AREDS 2 CAPS; take 1 capsule daily; Therapy: (Recorded:2017) to Recorded   5  Soya Lecithin 1200 MG Oral Capsule; take 1 capsule daily; Therapy: 61VGZ6975 to Recorded    Allergies    1  No Known Drug Allergies    2  No Known Environmental Allergies   3   No Known Food Allergies    Vitals   Recorded: 2017 02:23PM   Heart Rate 83   Respiration 16   Systolic 128, LUE, Sitting   Diastolic 84, LUE, Sitting   Height 6 ft    Weight 190 lb 0 8 oz   BMI Calculated 25 78   BSA Calculated 2 08   O2 Saturation 96     Physical Exam  No loss of sensation upper back     Constitutional   General appearance: No acute distress, well appearing and well nourished  Head and Face   Head and face: Normal     Eyes   Conjunctiva and lids: No erythema, swelling or discharge  Pupils and irises: Equal, round, reactive to light  Ears, Nose, Mouth, and Throat   External inspection of ears and nose: Normal     Otoscopic examination: Tympanic membranes translucent with normal light reflex  Canals patent without erythema  Hearing: Normal     Nasal mucosa, septum, and turbinates: Normal without edema or erythema  Lips, teeth, and gums: Normal, good dentition  Oropharynx: Normal with no erythema, edema, exudate or lesions  Neck   Neck: Supple, symmetric, trachea midline, no masses  Pulmonary   Respiratory effort: No increased work of breathing or signs of respiratory distress  Auscultation of lungs: Clear to auscultation  Cardiovascular   Auscultation of heart: Normal rate and rhythm, normal S1 and S2, no murmurs  Examination of extremities for edema and/or varicosities: Normal     Abdomen   Abdomen: Non-tender, no masses  Liver and spleen: No hepatomegaly or splenomegaly  Psychiatric   Mood and affect: Normal        Results/Data  (1) MAGNESIUM 36OLJ4551 06:44AM Gema Gong     Test Name Result Flag Reference   MAGNESIUM 2 2 mg/dL  1 6-2 6     (1) BASIC METABOLIC PROFILE 12QON0878 06:44AM Gema Gong     Test Name Result Flag Reference   GLUCOSE,RANDM 96 mg/dL     If the patient is fasting, the ADA then defines impaired fasting glucose as > 100 mg/dL and diabetes as > or equal to 123 mg/dL     SODIUM 139 mmol/L  136-145   POTASSIUM 4 4 mmol/L  3 5-5 3   CHLORIDE 104 mmol/L  100-108   CARBON DIOXIDE 30 mmol/L  21-32   ANION GAP (CALC) 5 mmol/L  4-13   BLOOD UREA NITROGEN 17 mg/dL  5-25 CREATININE 1 15 mg/dL  0 60-1 30   Standardized to IDMS reference method   CALCIUM 8 7 mg/dL  8 3-10 1   eGFR Non-African American      >60 0 ml/min/1 73sq niurka [employer] Education Program recommendations are as follows:  GFR calculation is accurate only with a steady state creatinine  Chronic Kidney disease less than 60 ml/min/1 73 sq  meters  Kidney failure less than 15 ml/min/1 73 sq  meters  GLUCOSE FASTING 96 mg/dL  65-99       Future Appointments    Date/Time Provider Specialty Site   10/18/2017 08:30 AM Cardiology, Device Remote   [address]   01/23/2018 09:30 AM Cardiology, Device Remote   [address]   04/24/2018 09:00 AM Cardiology, Device Remote   [address]   09/08/2017 04:40 PM NIURKA Garcia   Cardiology [facility]     Signatures   Electronically signed by : Murray West DO; Sep  8 2017  3:22PM EST                       (Author)

## 2018-01-11 NOTE — PROCEDURES
Procedures by Alison Whitney MD at 11/23/2016  1:35 PM      Author:  Alison Whitney MD  Service:  Cardiology  Author Type:  Fellow     Filed:  11/23/2016  2:25 PM  Date of Service:  11/23/2016  1:35 PM  Status:  Attested Addendum     :  Alison Whitney MD (Fellow)         Related Notes: Original Note by Alison Whitney MD (Fellow) filed at 11/23/2016  2:21 PM        Cosigner:  Margaret Millan DO at 11/23/2016  6:16 PM           Pre-procedure Diagnoses:       1  Atrial fibrillation [I48 91]                Procedures:       1  CARDIAC LOOP RECORDER IMPLANT [[id number] (Custom)]              Attestation signed by Margaret Millan DO at 11/23/2016  6:16 PM                  Teaching Physician Statement   I was present for and supervised the entire procedure  There were no complications  The postoperative signal on the Medtronic reveal LINQ was excellent  I splinted patient detail  Discharge instructions regarding his dressing  He will remove his dressing in 5-7 days  He will follow-up with Dr Humberto Solitario  This cardiac event recorder was requested by Dr Camila Moraes for continued atrial fibrillation management  Based on the results of this cardiac event recorder and his atrial fibrillation burden, Dr Camila Moraes will  decide medical management versus catheter ablation versus continued surveillance                                                   PRE-OP DIAGNOSIS:  Atrial fibrillation     POST-OP DIAGNOSIS:  Same     :  Margaret Millan DO    Assistant: Alison Whitney MD     ANESTHESIA:  Local anesthesia with 1% topical lidocaine    COMPLICATIONS:  None  The nature of the procedure, risks and alternatives were discussed with the patient who gave informed consent  OPERATIVE TECHNIQUE:  The patient draped in a sterile fashion  The 4th intercostal space 2 cm from the left edge of the sternum was identified  Local anesthesia was performed with 10mg of 1% Lidocaine   An incision was made with the push blade  The insertion tool was placed subcutaneously and rotated 180%  The plunger was inserted and the device was deployed  The skin was closed with 3x 3 0 ethilon  sutures  An antibiotic dressing was applied to the incision site  The device was interrogated post insertion and was working appropriately  There were no complications              Received Patrica Evans MD  Nov 23 2016  6:17PM [facility] Standard Time

## 2018-01-12 VITALS
DIASTOLIC BLOOD PRESSURE: 94 MMHG | HEART RATE: 69 BPM | RESPIRATION RATE: 16 BRPM | OXYGEN SATURATION: 97 % | WEIGHT: 193.04 LBS | HEIGHT: 72 IN | SYSTOLIC BLOOD PRESSURE: 140 MMHG | BODY MASS INDEX: 26.15 KG/M2

## 2018-01-12 VITALS
SYSTOLIC BLOOD PRESSURE: 120 MMHG | OXYGEN SATURATION: 96 % | RESPIRATION RATE: 16 BRPM | BODY MASS INDEX: 25.74 KG/M2 | DIASTOLIC BLOOD PRESSURE: 84 MMHG | HEART RATE: 83 BPM | WEIGHT: 190.05 LBS | HEIGHT: 72 IN

## 2018-01-12 NOTE — RESULT NOTES
Message   Notify the patient normal PSA follow up as scheduled        Verified Results  (1) LIPID PANEL, FASTING [id number] 07:12AM Becky Kim     Test Name Result Flag Reference   CHOLESTEROL, TOTAL 187 mg/dL  <200   HDL CHOLESTEROL 54 mg/dL  >47   TRIGLICERIDES 83 mg/dL  <078   LDL-CHOLESTEROL 115 mg/dL (calc) H    Reference range: <100     Desirable range <100 mg/dL for patients with CHD or  diabetes and <70 mg/dL for diabetic patients with  known heart disease  LDL-C is now calculated using the Gilbert-Zhu   calculation, which is a validated novel method providing   better accuracy than the Friedewald equation in the   estimation of LDL-C  Rodney Woods  [place] [address]1454;311(61): 4225-8870   (http://[employer]/faq/DRK869)   CHOL/HDLC RATIO 3 5 (calc)  <5 0   NON HDL CHOLESTEROL 133 mg/dL (calc) H <130   For patients with diabetes plus 1 major ASCVD risk   factor, treating to a non-HDL-C goal of <100 mg/dL   (LDL-C of <70 mg/dL) is considered a therapeutic   option  (1) COMPREHENSIVE METABOLIC PANEL [id number] 56:02TT Becky Kim     Test Name Result Flag Reference   GLUCOSE 87 mg/dL  65-99   Fasting reference interval   UREA NITROGEN (BUN) 17 mg/dL  7-25   CREATININE 1 14 mg/dL  0 70-1 33   For patients >52years of age, the reference limit  for Creatinine is approximately 13% higher for people  identified as -American  eGFR NON-AFR   AMERICAN 70 mL/min/1 73m2  > OR = 60   eGFR AFRICAN AMERICAN 81 mL/min/1 73m2  > OR = 60   BUN/CREATININE RATIO   6-97   NOT APPLICABLE (calc)   SODIUM 137 mmol/L  135-146   POTASSIUM 4 1 mmol/L  3 5-5 3   CHLORIDE 102 mmol/L     CARBON DIOXIDE 28 mmol/L  20-31   CALCIUM 9 1 mg/dL  8 6-10 3   PROTEIN, TOTAL 6 6 g/dL  6 1-8 1   ALBUMIN 4 2 g/dL  3 6-5 1   GLOBULIN 2 4 g/dL (calc)  1 9-3 7   ALBUMIN/GLOBULIN RATIO 1 8 (calc)  1 0-2 5   BILIRUBIN, TOTAL 1 0 mg/dL  0 2-1 2   ALKALINE PHOSPHATASE 42 U/L     AST 23 U/L 10-35   ALT 15 U/L  9-46     (Q) HEPATITIS C ANTIBODY [id number] 07:12AM [employer]     Test Name Result Flag Reference   HEPATITIS C ANTIBODY NON-REACTIVE  NON-REACTIVE   SIGNAL TO CUT-OFF 0 01  <1 00     (1) PSA (SCREEN) (Dx V76 44 Screen for Prostate Cancer) [id number] 07:12AM [employer]     Test Name Result Flag Reference   PSA, TOTAL 0 6 ng/mL  < OR = 4 0   The total PSA value from this assay system is   standardized against the WHO standard  The test   result will be approximately 20% lower when compared   to the equimolar-standardized total PSA (Garret   [place])  Comparison of serial PSA results should be   interpreted with this fact in mind  This test was performed using the Siemens   chemiluminescent method  Values obtained from   different assay methods cannot be used  interchangeably  PSA levels, regardless of  value, should not be interpreted as absolute  evidence of the presence or absence of disease         Signatures   Electronically signed by : Emperatriz Henao DO; Sep 14 2017  5:38PM EST                       (Author)

## 2018-01-12 NOTE — RESULT NOTES
Message   Notify the patient normal hemoglobin A1c follow-up as scheduled        Verified Results  (1) LIPID PANEL, FASTING [id number] 07:12AM Lurdes Clancy     Test Name Result Flag Reference   CHOLESTEROL, TOTAL 187 mg/dL  <200   HDL CHOLESTEROL 54 mg/dL  >46   TRIGLICERIDES 83 mg/dL  <866   LDL-CHOLESTEROL 115 mg/dL (calc) H    Reference range: <100     Desirable range <100 mg/dL for patients with CHD or  diabetes and <70 mg/dL for diabetic patients with  known heart disease  LDL-C is now calculated using the Gilbert-Zhu   calculation, which is a validated novel method providing   better accuracy than the Friedewald equation in the   estimation of LDL-C  Valinda Boast et al  [place] 1714670;398(31): 2991-6925   (http://[employer]/faq/VCE427)   CHOL/HDLC RATIO 3 5 (calc)  <5 0   NON HDL CHOLESTEROL 133 mg/dL (calc) H <130   For patients with diabetes plus 1 major ASCVD risk   factor, treating to a non-HDL-C goal of <100 mg/dL   (LDL-C of <70 mg/dL) is considered a therapeutic   option  (1) COMPREHENSIVE METABOLIC PANEL [id number] 05:57UK Lurdes Clancy     Test Name Result Flag Reference   GLUCOSE 87 mg/dL  65-99   Fasting reference interval   UREA NITROGEN (BUN) 17 mg/dL  7-25   CREATININE 1 14 mg/dL  0 70-1 33   For patients >52years of age, the reference limit  for Creatinine is approximately 13% higher for people  identified as -American  eGFR NON-AFR   AMERICAN 70 mL/min/1 73m2  > OR = 60   eGFR AFRICAN AMERICAN 81 mL/min/1 73m2  > OR = 60   BUN/CREATININE RATIO   2-31   NOT APPLICABLE (calc)   SODIUM 137 mmol/L  135-146   POTASSIUM 4 1 mmol/L  3 5-5 3   CHLORIDE 102 mmol/L     CARBON DIOXIDE 28 mmol/L  20-31   CALCIUM 9 1 mg/dL  8 6-10 3   PROTEIN, TOTAL 6 6 g/dL  6 1-8 1   ALBUMIN 4 2 g/dL  3 6-5 1   GLOBULIN 2 4 g/dL (calc)  1 9-3 7   ALBUMIN/GLOBULIN RATIO 1 8 (calc)  1 0-2 5   BILIRUBIN, TOTAL 1 0 mg/dL  0 2-1 2   ALKALINE PHOSPHATASE 42 U/L     AST 23 U/L  10-35   ALT 15 U/L  9-46     (Q) HEPATITIS C ANTIBODY [id number] 07:12AM Arturo Amador     Test Name Result Flag Reference   HEPATITIS C ANTIBODY NON-REACTIVE  NON-REACTIVE   SIGNAL TO CUT-OFF 0 01  <1 00     (1) PSA (SCREEN) (Dx V76 44 Screen for Prostate Cancer) [id number] 07:12AM Arturo Amador     Test Name Result Flag Reference   PSA, TOTAL 0 6 ng/mL  < OR = 4 0   The total PSA value from this assay system is   standardized against the WHO standard  The test   result will be approximately 20% lower when compared   to the equimolar-standardized total PSA (Garret   [place])  Comparison of serial PSA results should be   interpreted with this fact in mind  This test was performed using the Siemens   chemiluminescent method  Values obtained from   different assay methods cannot be used  interchangeably  PSA levels, regardless of  value, should not be interpreted as absolute  evidence of the presence or absence of disease  (Q) TESTOSTERONE, FREE AND TOTAL, LC/MS/MS [id number] 07:12AM Arturo Amador     Test Name Result Flag Reference   TESTOSTERONE, TOTAL,$LC/MS/ ng/dL  250-1100   For more information on this test, go to  http://[employer]/faq/  TotalTestosteroneLCMSMS        This test was developed and its analytical performance  characteristics have been determined by [address] [place] [address]  It has  not been cleared or approved by the [place]  Food and Drug  Administration  This assay has been validated pursuant  to the CLIA regulations and is used for clinical  purposes  FREE TESTOSTERONE 93 6 pg/mL  35 0-155 0   This test was developed and its analytical performance  characteristics have been determined by [address] [place] [address]  It has  not been cleared or approved by the [place] S  Food and Drug  Administration   This assay has been validated pursuant  to the CLIA regulations and is used for clinical  purposes  (Q) HEMOGLOBIN A1c [id number] 07:12AM Sarah Samedwardo   REPORT COMMENT:  FASTING:YES     Test Name Result Flag Reference   HEMOGLOBIN A1c 5 3 % of total Hgb  <5 7   For the purpose of screening for the presence of  diabetes:     <5 7%       Consistent with the absence of diabetes  5 7-6 4%    Consistent with increased risk for diabetes              (prediabetes)  > or =6 5%  Consistent with diabetes     This assay result is consistent with a decreased risk  of diabetes  Currently, no consensus exists regarding use of  hemoglobin A1c for diagnosis of diabetes in children  According to American Diabetes Association (ADA)  guidelines, hemoglobin A1c <7 0% represents optimal  control in non-pregnant diabetic patients  Different  metrics may apply to specific patient populations  Standards of Medical Care in Diabetes(ADA)         Signatures   Electronically signed by : Jada Mireles DO; Sep 14 2017  5:39PM EST                       (Author)

## 2018-01-13 VITALS
DIASTOLIC BLOOD PRESSURE: 76 MMHG | BODY MASS INDEX: 25.81 KG/M2 | HEART RATE: 73 BPM | WEIGHT: 190.56 LBS | SYSTOLIC BLOOD PRESSURE: 118 MMHG | HEIGHT: 72 IN

## 2018-01-13 NOTE — RESULT NOTES
Message   Notify the patient normal testosterone level follow up as scheduled        Verified Results  (1) LIPID PANEL, FASTING [id number] 07:12AM Sarah Khan     Test Name Result Flag Reference   CHOLESTEROL, TOTAL 187 mg/dL  <200   HDL CHOLESTEROL 54 mg/dL  >25   TRIGLICERIDES 83 mg/dL  <608   LDL-CHOLESTEROL 115 mg/dL (calc) H    Reference range: <100     Desirable range <100 mg/dL for patients with CHD or  diabetes and <70 mg/dL for diabetic patients with  known heart disease  LDL-C is now calculated using the Gilbert-Zhu   calculation, which is a validated novel method providing   better accuracy than the Friedewald equation in the   estimation of LDL-C  Zane Claros  [address]  3093;786(79): 2516-6045   (http://[employer]/faq/UPD541)   CHOL/HDLC RATIO 3 5 (calc)  <5 0   NON HDL CHOLESTEROL 133 mg/dL (calc) H <130   For patients with diabetes plus 1 major ASCVD risk   factor, treating to a non-HDL-C goal of <100 mg/dL   (LDL-C of <70 mg/dL) is considered a therapeutic   option  (1) COMPREHENSIVE METABOLIC PANEL [id number] 60:55WX Sarah Khan     Test Name Result Flag Reference   GLUCOSE 87 mg/dL  65-99   Fasting reference interval   UREA NITROGEN (BUN) 17 mg/dL  7-25   CREATININE 1 14 mg/dL  0 70-1 33   For patients >52years of age, the reference limit  for Creatinine is approximately 13% higher for people  identified as -American  eGFR NON-AFR   AMERICAN 70 mL/min/1 73m2  > OR = 60   eGFR AFRICAN AMERICAN 81 mL/min/1 73m2  > OR = 60   BUN/CREATININE RATIO   0-64   NOT APPLICABLE (calc)   SODIUM 137 mmol/L  135-146   POTASSIUM 4 1 mmol/L  3 5-5 3   CHLORIDE 102 mmol/L     CARBON DIOXIDE 28 mmol/L  20-31   CALCIUM 9 1 mg/dL  8 6-10 3   PROTEIN, TOTAL 6 6 g/dL  6 1-8 1   ALBUMIN 4 2 g/dL  3 6-5 1   GLOBULIN 2 4 g/dL (calc)  1 9-3 7   ALBUMIN/GLOBULIN RATIO 1 8 (calc)  1 0-2 5   BILIRUBIN, TOTAL 1 0 mg/dL  0 2-1 2   ALKALINE PHOSPHATASE 42 U/L   AST 23 U/L  10-35   ALT 15 U/L  9-46     (Q) HEPATITIS C ANTIBODY [id number] 07:12AM Nicolasa Number     Test Name Result Flag Reference   HEPATITIS C ANTIBODY NON-REACTIVE  NON-REACTIVE   SIGNAL TO CUT-OFF 0 01  <1 00     (1) PSA (SCREEN) (Dx V76 44 Screen for Prostate Cancer) [id number] 07:12AM Nicolasa Number     Test Name Result Flag Reference   PSA, TOTAL 0 6 ng/mL  < OR = 4 0   The total PSA value from this assay system is   standardized against the WHO standard  The test   result will be approximately 20% lower when compared   to the equimolar-standardized total PSA (Garret   [place])  Comparison of serial PSA results should be   interpreted with this fact in mind  This test was performed using the Siemens   chemiluminescent method  Values obtained from   different assay methods cannot be used  interchangeably  PSA levels, regardless of  value, should not be interpreted as absolute  evidence of the presence or absence of disease  (Q) TESTOSTERONE, FREE AND TOTAL, LC/MS/MS [id number] 07:12AM Nicolasa Pratt     Test Name Result Flag Reference   TESTOSTERONE, TOTAL,$LC/MS/ ng/dL  250-1100   For more information on this test, go to  http://[employer]/faq/  TotalTestosteroneLCMSMS        This test was developed and its analytical performance  characteristics have been determined by [address] [place] [address]  It has  not been cleared or approved by the [place]  Food and Drug  Administration  This assay has been validated pursuant  to the CLIA regulations and is used for clinical  purposes  FREE TESTOSTERONE 93 6 pg/mL  35 0-155 0   This test was developed and its analytical performance  characteristics have been determined by [address] [place] [address]  It has  not been cleared or approved by the [place]  Food and Drug  Administration   This assay has been validated pursuant  to the CLIA regulations and is used for clinical  purposes         Signatures   Electronically signed by : Kurt Barragan DO; Sep 14 2017  5:39PM EST                       (Author)

## 2018-01-14 VITALS
WEIGHT: 197.25 LBS | SYSTOLIC BLOOD PRESSURE: 126 MMHG | HEART RATE: 133 BPM | BODY MASS INDEX: 26.72 KG/M2 | HEIGHT: 72 IN | DIASTOLIC BLOOD PRESSURE: 82 MMHG

## 2018-01-14 VITALS
HEIGHT: 72 IN | WEIGHT: 196.25 LBS | HEART RATE: 60 BPM | SYSTOLIC BLOOD PRESSURE: 124 MMHG | DIASTOLIC BLOOD PRESSURE: 82 MMHG | BODY MASS INDEX: 26.58 KG/M2

## 2018-01-14 VITALS
SYSTOLIC BLOOD PRESSURE: 122 MMHG | HEART RATE: 71 BPM | DIASTOLIC BLOOD PRESSURE: 80 MMHG | HEIGHT: 72 IN | BODY MASS INDEX: 26.68 KG/M2 | WEIGHT: 197 LBS

## 2018-01-16 NOTE — RESULT NOTES
Message   Notify the patient negative hepatitis C antibody follow-up as scheduled        Verified Results  (1) LIPID PANEL, FASTING [id number] 07:12AM [employer]     Test Name Result Flag Reference   CHOLESTEROL, TOTAL 187 mg/dL  <200   HDL CHOLESTEROL 54 mg/dL  >93   TRIGLICERIDES 83 mg/dL  <023   LDL-CHOLESTEROL 115 mg/dL (calc) H    Reference range: <100     Desirable range <100 mg/dL for patients with CHD or  diabetes and <70 mg/dL for diabetic patients with  known heart disease  LDL-C is now calculated using the Gilbert-Zhu   calculation, which is a validated novel method providing   better accuracy than the Friedewald equation in the   estimation of LDL-C  Sarah Chamorro  [place]  7748;770(56): 2571-9691   (http://[employer]/faq/WRI320)   CHOL/HDLC RATIO 3 5 (calc)  <5 0   NON HDL CHOLESTEROL 133 mg/dL (calc) H <130   For patients with diabetes plus 1 major ASCVD risk   factor, treating to a non-HDL-C goal of <100 mg/dL   (LDL-C of <70 mg/dL) is considered a therapeutic   option  (1) COMPREHENSIVE METABOLIC PANEL [id number] 19:37PV [employer]     Test Name Result Flag Reference   GLUCOSE 87 mg/dL  65-99   Fasting reference interval   UREA NITROGEN (BUN) 17 mg/dL  7-25   CREATININE 1 14 mg/dL  0 70-1 33   For patients >52years of age, the reference limit  for Creatinine is approximately 13% higher for people  identified as -American  eGFR NON-AFR   AMERICAN 70 mL/min/1 73m2  > OR = 60   eGFR AFRICAN AMERICAN 81 mL/min/1 73m2  > OR = 60   BUN/CREATININE RATIO   3-07   NOT APPLICABLE (calc)   SODIUM 137 mmol/L  135-146   POTASSIUM 4 1 mmol/L  3 5-5 3   CHLORIDE 102 mmol/L     CARBON DIOXIDE 28 mmol/L  20-31   CALCIUM 9 1 mg/dL  8 6-10 3   PROTEIN, TOTAL 6 6 g/dL  6 1-8 1   ALBUMIN 4 2 g/dL  3 6-5 1   GLOBULIN 2 4 g/dL (calc)  1 9-3 7   ALBUMIN/GLOBULIN RATIO 1 8 (calc)  1 0-2 5   BILIRUBIN, TOTAL 1 0 mg/dL  0 2-1 2   ALKALINE PHOSPHATASE 42 U/L    AST 23 U/L  10-35   ALT 15 U/L  9-46     (Q) HEPATITIS C ANTIBODY [id number] 07:12AM Lurdes Clancy     Test Name Result Flag Reference   HEPATITIS C ANTIBODY NON-REACTIVE  NON-REACTIVE   SIGNAL TO CUT-OFF 0 01  <1 00       Signatures   Electronically signed by : Ivan Thompson DO; Sep 14 2017  5:38PM EST                       (Author)

## 2018-01-23 ENCOUNTER — GENERIC CONVERSION - ENCOUNTER (OUTPATIENT)
Dept: OTHER | Facility: OTHER | Age: 58
End: 2018-01-23

## 2018-01-23 ENCOUNTER — ALLSCRIPTS OFFICE VISIT (OUTPATIENT)
Dept: OTHER | Facility: OTHER | Age: 58
End: 2018-01-23

## 2018-03-07 NOTE — PROGRESS NOTES
"  Discussion/Summary  Normal device function     No afib since mid June, dramatic decrease in burden since ablation  Results/Data  Cardiac Device Remote [id number] 11:25AM Carla Viepagetrevor     Test Name Result Flag Reference   MISCELLANEOUS COMMENT (Report)     CARELINK TRANSMISSION: LOOP RECORDER  PRESENTING RHYTHM NSR @ 71 BPM  BATTERY STATUS "OK"  84 AF EPISODES W/ EGRAMS SHOWING AF/ AFL @  BPM  LONGEST EPISODE 2:22 HOURS  AF BURDEN = 4 2%  HX OF SAME  PT TAKES PRADAXA  NO PATIENT ACTIVATED EPISODES  NORMAL DEVICE FUNCTION  DL   Cardiac Electrophysiology Report      sl[id number]  pdf   DEVICE TYPE Monitor       Cardiac Electrophysiology Report [id number] 11:25AM Carla Morales     Test Name Result Flag Reference   Cardiac Electrophysiology Report      [id number]  pdf     Signatures   Electronically signed by : Joseph Shine, ; Jul 6 2017  1:21PM EST                       (Author)    Electronically signed by : NIURKA Weller ; Jul 6 2017  2:02PM EST                       (Author)    "

## 2018-03-07 NOTE — PROGRESS NOTES
"  Discussion/Summary  Normal device function     Known afib  Results/Data  Cardiac Device Remote [id number] 02:32PM Cyril Clayman     Test Name Result Flag Reference   MISCELLANEOUS COMMENT (Report)     CARELINK TRANSMISSION: LOOP RECORDER [2 PDFs] PRESENTING RHYTHM NSR @ 81 BPM  BATTERY STATUS "OK"  173 AF EPISODES W/ EGRAMS SHOWING AF/AFL W/ RARE PVCs, RVR AND MYOPOTENTIAL OVERSENSING  AF BURDEN FROM 2/2-3/3/17 = 15 7%  AF BURDEN FROM 3/3-3/7/17 = 30 8%  HX OF CHRONIC AF  PT TAKES ASA 81  PT OPTED NOT TO TAKE AC MEDS  NO PATIENT ACTIVATED EPISODES  NORMAL DEVICE FUNCTION  DL   Cardiac Electrophysiology Report      sl[id number]_[place]_[id number]  pdf   DEVICE TYPE Monitor       Cardiac Electrophysiology Report [id number] 02:32PM Dc Clayman     Test Name Result Flag Reference   Cardiac Electrophysiology Report      [id number]  pdf     Cardiac Electrophysiology Report [id number] 02:32PM Cyril Clayman     Test Name Result Flag Reference   Cardiac Electrophysiology Report      [id number]  pdf     Signatures   Electronically signed by : Balbir Sorto, ; Mar  7 2017 11:29AM EST                       (Author)    Electronically signed by : Diane Riojas DO; Mar  7 2017  3:29PM EST                       (Author)    "

## 2018-03-07 NOTE — PROGRESS NOTES
"  Discussion/Summary  Normal device function      Results/Data  Cardiac Device Remote [id number] 01:59PM Kathreen Smaller     Test Name Result Flag Reference   MISCELLANEOUS COMMENT      CARELINK TRANSMISSION: LOOP RECORDER  PRESENTING RHYTHM NSR @ 69 BPM  BATTERY STATUS "OK"  NO PATIENT OR DEVICE ACTIVATED EPISODES  NORMAL DEVICE FUNCTION  DL   Cardiac Electrophysiology Report      ASP[id number]  pdf   DEVICE TYPE Monitor       Cardiac Electrophysiology Report [id number] 01:59PM Kathreen Smaller     Test Name Result Flag Reference   Cardiac Electrophysiology Report      [id number]  pdf     Signatures   Electronically signed by : Blossom Guzmán, ; Jan 23 2018 11:09AM EST                       (Author)    Electronically signed by : NIURKA Silverman ; Jan 23 2018 12:16PM EST                       (Author)    "

## 2018-03-07 NOTE — PROGRESS NOTES
"  Discussion/Summary  Normal device function      Results/Data  Cardiac Device Remote [id number] 07:36PM Rashid Flori     Test Name Result Flag Reference   MISCELLANEOUS COMMENT      CARELINK TRANSMISSION: LOOP RECORDER  PRESENTING RHYTHM NSR @ 70 BPM  BATTERY STATUS "OK"  NO PATIENT OR DEVICE ACTIVATED EPISODES  NORMAL DEVICE FUNCTION  DL   Cardiac Electrophysiology Report      AS[id number] pdf   DEVICE TYPE Monitor       Cardiac Electrophysiology Report [id number] 07:36PM Rashid Flori     Test Name Result Flag Reference   Cardiac Electrophysiology Report      [id number]  pdf     Signatures   Electronically signed by : Kalyn Aragon, ; Oct 19 2017  1:26PM EST                       (Author)    Electronically signed by : NIURKA Nails ; Oct 19 2017  1:54PM EST                       (Author)    "

## 2018-03-07 NOTE — PROGRESS NOTES
Discussion/Summary  Normal device function      Results/Data    [id number] 05:05AM   Cardiac Device Remote   MISCELLANEOUS COMMENT: CARELINK TRANSMISSION:(LOOP) ALERTEPISODE EPISODE  LASTING 42 MIN   RVR NOTED ---ALSTON   Cardiac Electrophysiology  Report:  DEVICE TYPE: Monitor   Cardiac Electrophysiology Report   Cardiac Electrophysiology Report:    Signatures   Electronically signed by : Antonina Skaggs, ; Feb 2 2017  8:15AM EST                       (Author)    Electronically signed by : NIURKA Sánchez ; Feb 2 2017 12:36PM EST                       (Author)

## 2018-03-07 NOTE — PROGRESS NOTES
"  Discussion/Summary  Normal device function     Cannot r/o sinus tachycardia vs SVT  Results/Data  Cardiac Device Remote [id number] 04:05AM Kecia Qasim     Test Name Result Flag Reference   MISCELLANEOUS COMMENT      NONBILLABLE- CARELINK TRANSMISSION ALERT FOR TACHY EPISODE LASTING 6 5 MINS @ 188 BPM- SVT ON ECG  PT ON PRADAXA  NORMAL DEVICE FUNCTION  GV   Cardiac Electrophysiology Report      sl[id number]_[id number]  pdf   DEVICE TYPE Monitor       Cardiac Electrophysiology Report [id number] 04:05AM Kecia Tripp     Test Name Result Flag Reference   Cardiac Electrophysiology Report      [id number] pdf     Signatures   Electronically signed by : Kamila Vazquez RN; Aug 14 2017 12:36PM EST                       (Author)    Electronically signed by : NIURKA Vogel ; Aug 15 2017  8:29AM EST                       (Author)    "

## 2018-03-07 NOTE — PROGRESS NOTES
"  Discussion/Summary  Normal device function      Results/Data  Cardiac Device In Clinic [id number] 01:37PM Yosvany Search     Test Name Result Flag Reference   MISCELLANEOUS COMMENT (Report)     DEVICE INTERROGATED IN THE [facility] OFFICE  BATTERY VOLTAGE "OK"  109 AF EPISODES LONGEST 16 HOURS  PATIENT IS ON 2 81mg ASA  NO PATIENT ACTIVATED EPISODES  WOUND CHECK: INCISION CLEAN AND DRY WITH EDGES APPROXIMATED; SUTURES REMOVED IN CARLOS; WOUND CARE AND RESTRICTIONS REVIEWED WITH PATIENT  ---ALSTON   Cardiac Electrophysiology Report      sl[id number] Slater HospitalELL_[id number]  pdf   DEVICE TYPE Monitor       Cardiac Electrophysiology Report [id number] 01:37PM Yosvany Search     Test Name Result Flag Reference   Cardiac Electrophysiology Report      [id number]  pdf     Signatures   Electronically signed by : Abdi Gongora, ; Dec 21 2016  8:52AM EST                       (Author)    Electronically signed by : NIURKA Reyes ; Dec 21 2016 12:01PM EST                       (Author)    "

## 2018-03-07 NOTE — PROGRESS NOTES
"  Discussion/Summary  Normal device function      Results/Data  Cardiac Device Remote [id number] 02:40PM Star Meron     Test Name Result Flag Reference   MISCELLANEOUS COMMENT (Report)     CARELINK TRANSMISSION: LOOP RECORDER  PRESENTING RHYHTM AFIB @ 104 BPM  BATTERY STATUS "OK"  1 PAUSE EPISODE PREVIOUSLY ADDRESSED IN ALERT JAN 12,2017  310 AF EPISODES W/ EGRAMS SHOWING AF/AFL W/ MYOPOTENTIAL OVERSENSING   RVR SEEN  HX OF SAME  PT TAKES ASA 81  PT OPTED NOT TO TAKE Fredderick Bevels [ SEE  OFFICE JAN 4,2017]  AF BURDEN = 22 5%  EF=60% [ ECHO JAN 2014]  NO PATIENT ACTIVATED EPISODES  NORMAL DEVICE FUNCTION  DL   Cardiac Electrophysiology Report      sl[id number]  pdf   DEVICE TYPE Monitor       Cardiac Electrophysiology Report [id number] 02:40PM Star Meron     Test Name Result Flag Reference   Cardiac Electrophysiology Report      [id number] pdf     Signatures   Electronically signed by : Jonathan Oneill, ; Feb 2 2017 12:38PM EST                       (Author)    Electronically signed by : Mikie Duvall DO; Feb 5 2017  3:26PM EST                       (Author)    "

## 2018-03-07 NOTE — PROGRESS NOTES
"  Discussion/Summary  Normal device function     Patient has opted to not take oral AC  Results/Data  Cardiac Device Remote 05Apr2017 12:50PM Zula Ronal     Test Name Result Flag Reference   MISCELLANEOUS COMMENT (Report)     CARELINK TRANSMISSION: LOOP RECORDER  PRESENTING RHYTHM AF @ 96 BPM  BATTERY STATUS "OK"  281 AF EPISODES W/ EGRAMS SHOWING AF @  BPM   LONGEST EPISODE 13 HOURS  HX OF AF  AF BURDEN= 27 9%  PT TAKES ASA 81  PT OPTED NOT TO TAKE AC  NO PATIENT ACTIVATED EPISODES  NORMAL DEVICE FUNCTION  DL   Cardiac Electrophysiology Report      [id number]  pdf   DEVICE TYPE Monitor       Cardiac Electrophysiology Report [id number] 12:50PM Zula Ronal     Test Name Result Flag Reference   Cardiac Electrophysiology Report      [id number]  pdf     Signatures   Electronically signed by : Chikis Fried, ; Apr 5 2017  8:37AM EST                       (Author)    Electronically signed by : Felipe Grubbs DO;  Apr 9 2017  9:57AM EST                       (Author)    "

## 2018-04-26 ENCOUNTER — REMOTE DEVICE CLINIC VISIT (OUTPATIENT)
Dept: CARDIOLOGY CLINIC | Facility: CLINIC | Age: 58
End: 2018-04-26
Payer: COMMERCIAL

## 2018-04-26 DIAGNOSIS — I48.20 CHRONIC ATRIAL FIBRILLATION (HCC): Primary | ICD-10-CM

## 2018-04-26 DIAGNOSIS — Z95.818 PRESENCE OF OTHER CARDIAC IMPLANTS AND GRAFTS: ICD-10-CM

## 2018-04-26 PROCEDURE — 93299 PR REM INTERROG ICPMS/SCRMS <30 D TECH REVIEW: CPT | Performed by: INTERNAL MEDICINE

## 2018-04-26 PROCEDURE — 93298 REM INTERROG DEV EVAL SCRMS: CPT | Performed by: INTERNAL MEDICINE

## 2018-04-26 NOTE — PROGRESS NOTES
MDT LOOP  CARELINK TRANSMISSION: LOOP RECORDER  PRESENTING RHYTHM NSR W/ PACs @ 71 BPM  BATTERY STATUS "OK"  1 AF EPISODE W/ EGRAM SHOWING PROBABLE AF @ 97 BPM FOR 6 MINS  HX OF AF   PT TAKES ASA 81 AND PRADAXA  NO PATIENT ACTIVATED EPISODES  NORMAL DEVICE FUNCTION   DL

## 2018-07-19 RX ORDER — MULTIVITAMIN
1 TABLET ORAL DAILY
COMMUNITY
End: 2018-08-10 | Stop reason: ALTCHOICE

## 2018-07-19 RX ORDER — SILDENAFIL 25 MG/1
TABLET, FILM COATED ORAL
Refills: 12 | COMMUNITY
Start: 2018-05-19 | End: 2021-03-16 | Stop reason: SDUPTHER

## 2018-07-19 RX ORDER — ASCORBIC ACID 500 MG
1 CAPSULE, EXTENDED RELEASE ORAL DAILY
COMMUNITY
Start: 2017-09-08 | End: 2019-09-09 | Stop reason: CLARIF

## 2018-07-26 ENCOUNTER — REMOTE DEVICE CLINIC VISIT (OUTPATIENT)
Dept: CARDIOLOGY CLINIC | Facility: CLINIC | Age: 58
End: 2018-07-26
Payer: COMMERCIAL

## 2018-07-26 DIAGNOSIS — I48.20 CHRONIC ATRIAL FIBRILLATION (HCC): ICD-10-CM

## 2018-07-26 DIAGNOSIS — Z45.09 ENCOUNTER FOR LOOP RECORDER CHECK: Primary | ICD-10-CM

## 2018-07-26 PROCEDURE — 93296 REM INTERROG EVL PM/IDS: CPT | Performed by: INTERNAL MEDICINE

## 2018-07-26 PROCEDURE — 93298 REM INTERROG DEV EVAL SCRMS: CPT | Performed by: INTERNAL MEDICINE

## 2018-07-30 NOTE — PROGRESS NOTES
MDT LOOP  CARELINK TRANSMISSION: BATTERY STATUS "OK"  2 AMS EPISODES, UP TO 2 MINUTES IN DURATION  PT TAKES PRADAXA  UNDERLYING SR @ 80 BPM  NO OTHER EPISODES  NORMAL DEVICE FUNCTION   PL

## 2018-08-10 ENCOUNTER — OFFICE VISIT (OUTPATIENT)
Dept: INTERNAL MEDICINE CLINIC | Facility: CLINIC | Age: 58
End: 2018-08-10
Payer: COMMERCIAL

## 2018-08-10 VITALS
HEIGHT: 70 IN | SYSTOLIC BLOOD PRESSURE: 110 MMHG | OXYGEN SATURATION: 97 % | BODY MASS INDEX: 27.2 KG/M2 | RESPIRATION RATE: 16 BRPM | DIASTOLIC BLOOD PRESSURE: 74 MMHG | HEART RATE: 65 BPM | WEIGHT: 190 LBS

## 2018-08-10 DIAGNOSIS — Z00.00 WELLNESS EXAMINATION: ICD-10-CM

## 2018-08-10 DIAGNOSIS — Z11.4 SCREENING FOR HIV (HUMAN IMMUNODEFICIENCY VIRUS): Primary | ICD-10-CM

## 2018-08-10 DIAGNOSIS — R73.9 ELEVATED BLOOD SUGAR: ICD-10-CM

## 2018-08-10 PROCEDURE — 99396 PREV VISIT EST AGE 40-64: CPT | Performed by: INTERNAL MEDICINE

## 2018-08-10 NOTE — PROGRESS NOTES
Assessment/Plan:           Problem List Items Addressed This Visit        Other    Wellness examination     Assessment and plan 1  Health maintenance annual wellness examination overall the patient is clinically stable and doing well, we encouraged the patient to follow a healthy and balanced diet  We recommend that the patient exercise routinely approximately 30 minutes 5 times per week   We have reviewed the patient's vaccines and have made recommendations for updates if necessary flu vaccine in the fall, recommend the new shingles vaccine if interested he may sign up on the wait list for when it becomes available  We will be ordering screening laboratories which are age appropriate  Return to the office in  6monts    call if any problems  I did review the patient's loop monitor and sent a message to the cardiologist to review it did have 2 episodes of the AFib lasting short is going on aspirin currently he is clinically stable doing well  Patient will follow up with his cardiologist            Other Visit Diagnoses     Screening for HIV (human immunodeficiency virus)    -  Primary    Relevant Orders    HIV 1/2 AG-AB combo    Elevated blood sugar        Relevant Orders    Hemoglobin A1C          Return to office 12  months  call if any problems  Subjective:      Patient ID: Kenji Huertas is a 59 y o  male      HPI   AWV Clinical     ISAR:       Once in a Lifetime Medicare Screening:       Medicare Screening Tests and Risk Assessment:   AAA Risk Assessment    Osteoporosis Risk Assessment    HIV Risk Assessment        Drug and Alcohol Use:   Tobacco use    Cigarettes:  never smoker    Smokeless:  never used smokeless tobacco    Tobacco use duration    Tobacco Cessation Readiness    Alcohol use    Alcohol use:  occasional use    Amount of alcohol consumed:  4-7/ weeek   Concern about alcohol use:  No    Alcohol Treatment Readiness   Illicit Drug Use    Drug use:  never        Diet & Exercise:   Diet   What is your diet?:  Low Saturated Fat, Limited junk food, Low Carb   How many servings a day of the following:   Fruits and Vegetables:  3-4 Meat:  1-2   Whole Grains:  4 Simple Carbs:  2   Dairy:  1 Soda:  0   Coffee:  0 Tea:  0   Exercise    Do you currently exercise?:  yes    Frequency:  daily   Times per week:  5     Type of exercise:  cardio   running        Cognitive Impairment Screening:   Depression screening preformed:  Yes    Cognitive Impairment Screening        Functional Ability/Level of Safety:   Hearing    Hearing difficulties:  No    Hearing Impairment Assessment    Current Activities    Help needed with the folllowing:    ADL    Fall Risk   Have you fallen in the last 12 months?:  No    Injury History       Home Safety:   Do you have handrails and grab-bars in the home?:  Yes    Do you have working smoke alarms and fire extinguisher?:  Yes Do all household members know how to use them?:  Yes   Home Safety Risk Factors       Advanced Directives:   Advanced Directives    Living Will:  No    Advanced directive:  No    Patient's End of Life Decisions        Urinary Incontinence:       Glaucoma: The following portions of the patient's history were reviewed and updated as appropriate: allergies, current medications, past family history, past medical history, past social history, past surgical history and problem list     Review of Systems      Objective:                    No Follow-up on file        No Known Allergies    Past Medical History:   Diagnosis Date    Hypertension     Irregular heartbeat      Past Surgical History:   Procedure Laterality Date    INGUINAL HERNIA REPAIR Left     TONSILLECTOMY      TOOTH EXTRACTION       Current Outpatient Prescriptions on File Prior to Visit   Medication Sig Dispense Refill    amLODIPine-benazepril (LOTREL 5-10) 5-10 MG per capsule Take 1 capsule by mouth daily      aspirin (ECOTRIN LOW STRENGTH) 81 mg EC tablet Take 2 tablets by mouth daily for 30 days 60 tablet 0    Multiple Vitamins-Minerals (CENTRUM SILVER ADULT 50+) TABS Take 1 tablet by mouth      Multiple Vitamins-Minerals (ICAPS AREDS 2 PO) Take 1 capsule by mouth      sildenafil (VIAGRA) 25 MG tablet TAKE ONE HALF TABLET AS NEEDED  12    Soya Lecithin 1200 MG CAPS Take 1 capsule by mouth daily       No current facility-administered medications on file prior to visit  Family History   Problem Relation Age of Onset    Heart disease Father      Social History     Social History    Marital status: /Civil Union     Spouse name: N/A    Number of children: 3    Years of education: N/A     Occupational History    Not on file       Social History Main Topics    Smoking status: Never Smoker    Smokeless tobacco: Never Used    Alcohol use Yes      Comment: 6 drinks a week    Drug use: No    Sexual activity: Not on file     Other Topics Concern    Not on file     Social History Narrative    No narrative on file     Vitals:    08/10/18 0831   BP: 110/74   BP Location: Right arm   Patient Position: Sitting   Cuff Size: Standard   Pulse: 65   Resp: 16   SpO2: 97%   Weight: 86 2 kg (190 lb)   Height: 5' 10" (1 778 m)     Results for orders placed or performed in visit on 09/14/17   HEMOGLOBIN A1C (HISTORICAL)   Result Value Ref Range    Hemoglobin A1C 5 3 <5 7 % of total Hgb   Testosterone, Total, LC/MS/MS (Historical)   Result Value Ref Range    TESTOSTERONE TOTAL 650 250 - 1100 ng/dL    TESTOSTERONE FREE 93 6 35 0 - 155 0 pg/mL   HEPATITIS C ANTIBODY (HISTORICAL)   Result Value Ref Range    HEPATITIS C ANTIBODY NON-REACTIVE NON-REACTIVE    SIGNAL TO CUT-OFF (HISTORICAL) 0 01 <1 00   COMPREHENSIVE METABOLIC PANEL (HISTORICAL)   Result Value Ref Range    Glucose 87 65 - 99 mg/dL    BUN 17 7 - 25 mg/dL    Creatinine 1 14 0 70 - 1 33 mg/dL    EGFR-AMERICAN CALC 70 > OR = 60 mL/min/1 73m2    eGFR  81 > OR = 60 mL/min/1 73m2    BUN/CREA Ratio NOT APPLICABLE 6 - 22 (calc) Sodium 137 135 - 146 mmol/L    Potassium 4 1 3 5 - 5 3 mmol/L    Chloride 102 98 - 110 mmol/L    CO2 28 20 - 31 mmol/L    Calcium 9 1 8 6 - 10 3 mg/dL    Total Protein 6 6 6 1 - 8 1 g/dL    Albumin 4 2 3 6 - 5 1 g/dL    GAMMA GLOBULIN 2 4 1 9 - 3 7 g/dL (calc)    A/G RATIO 1 8 1 0 - 2 5 (calc)    Total Bilirubin 1 0 0 2 - 1 2 mg/dL    Alkaline Phosphatase 42 40 - 115 U/L    AST 23 10 - 35 U/L    ALT 15 9 - 46 U/L   PSA,TOTAL SCREEN (HISTORICAL)   Result Value Ref Range    PROSTATE SPECIFIC ANTIGEN TOTAL 0 6 < OR = 4 0 ng/mL   LIPID PANEL (HISTORICAL)   Result Value Ref Range    Cholesterol 187 <200 mg/dL    HDL 54 >40 mg/dL    Triglycerides 83 <150 mg/dL    LDL CHOLESTEROL 115 (H) mg/dL (calc)    Chol/HDL Ratio 3 5 <5 0 (calc)    NON-HDL-CHOL (CHOL-HDL) 133 (H) <130 mg/dL (calc)     Weight (last 2 days)     Date/Time   Weight    08/10/18 0831  86 2 (190)            Body mass index is 27 26 kg/m²  BP      Temp      Pulse     Resp      SpO2        Vitals:    08/10/18 0831   Weight: 86 2 kg (190 lb)     Vitals:    08/10/18 0831   Weight: 86 2 kg (190 lb)       /74 (BP Location: Right arm, Patient Position: Sitting, Cuff Size: Standard)   Pulse 65   Resp 16   Ht 5' 10" (1 778 m)   Wt 86 2 kg (190 lb)   SpO2 97%   BMI 27 26 kg/m²          Physical Exam   Constitutional: He appears well-developed and well-nourished  No distress  HENT:   Head: Normocephalic and atraumatic  Right Ear: External ear normal    Left Ear: External ear normal    Mouth/Throat: Oropharynx is clear and moist    Eyes: Conjunctivae are normal  Pupils are equal, round, and reactive to light  Right eye exhibits no discharge  Left eye exhibits no discharge  No scleral icterus  Neck: Neck supple  Cardiovascular: Normal rate, regular rhythm and normal heart sounds  Exam reveals no gallop and no friction rub  No murmur heard  Pulmonary/Chest: No respiratory distress  He has no wheezes  He has no rales  Abdominal: Soft   Bowel sounds are normal  He exhibits no distension and no mass  There is no tenderness  There is no rebound and no guarding  Musculoskeletal: He exhibits no edema or deformity  Lymphadenopathy:     He has no cervical adenopathy  Neurological: He is alert  Skin: He is not diaphoretic  Psychiatric: He has a normal mood and affect

## 2018-08-12 NOTE — ASSESSMENT & PLAN NOTE
Assessment and plan 1  Health maintenance annual wellness examination overall the patient is clinically stable and doing well, we encouraged the patient to follow a healthy and balanced diet  We recommend that the patient exercise routinely approximately 30 minutes 5 times per week   We have reviewed the patient's vaccines and have made recommendations for updates if necessary flu vaccine in the fall, recommend the new shingles vaccine if interested he may sign up on the wait list for when it becomes available  We will be ordering screening laboratories which are age appropriate  Return to the office in  6monts    call if any problems  I did review the patient's loop monitor and sent a message to the cardiologist to review it did have 2 episodes of the AFib lasting short is going on aspirin currently he is clinically stable doing well    Patient will follow up with his cardiologist

## 2018-09-07 ENCOUNTER — OFFICE VISIT (OUTPATIENT)
Dept: CARDIOLOGY CLINIC | Facility: CLINIC | Age: 58
End: 2018-09-07
Payer: COMMERCIAL

## 2018-09-07 VITALS
WEIGHT: 197.9 LBS | HEIGHT: 70 IN | SYSTOLIC BLOOD PRESSURE: 142 MMHG | DIASTOLIC BLOOD PRESSURE: 90 MMHG | HEART RATE: 60 BPM | RESPIRATION RATE: 16 BRPM | BODY MASS INDEX: 28.33 KG/M2

## 2018-09-07 DIAGNOSIS — I48.91 ATRIAL FIBRILLATION, UNSPECIFIED TYPE (HCC): Primary | ICD-10-CM

## 2018-09-07 DIAGNOSIS — I48.3 TYPICAL ATRIAL FLUTTER (HCC): ICD-10-CM

## 2018-09-07 PROCEDURE — 99213 OFFICE O/P EST LOW 20 MIN: CPT | Performed by: INTERNAL MEDICINE

## 2018-09-07 PROCEDURE — 93000 ELECTROCARDIOGRAM COMPLETE: CPT | Performed by: INTERNAL MEDICINE

## 2018-09-07 NOTE — PROGRESS NOTES
HEART  [facility]    Outpatient Follow-up  Today's Date: 09/07/18        Patient name: Anita Pierce  YOB: 1960  Sex: male         Chief Complaint: f/u afib      ASSESSMENT:  Problem List Items Addressed This Visit     None      Visit Diagnoses     Atrial fibrillation, unspecified type [facility])    -  Primary    Relevant Orders    POCT ECG    Typical atrial flutter ([address] )        Relevant Orders    POCT ECG        58 yo male    S/p successful ab  Great exercise tolerance  Two 2 min of  afib episodes since ablatio (0% burden)> He was 27 9% burden prior to ablation and in typical flutter  -[id number] on asa only   ) HTN well controlled on amlodipine    ) CHest pressue during long hike on 8/12/17  Since then has done exercise w/o limitations  No Q waves on EKG      PLAN:    1  Continue BP control  Doing well  Very very low burden of afib is not concerning and asymptomatic        Follow up in: 1 year    Orders Placed This Encounter   Procedures    POCT ECG     There are no discontinued medications    HPI/Subjective: 58 yo male    S/p successful ab  Great exercise tolerance  Two 2 min of  afib episodes since ablatio (0% burden)> He was 27 9% burden prior to ablation and in typical flutter  -[id number] on asa only   ) HTN well controlled on amlodipine    ) CHest pressue during long hike on 8/12/17  Since then has done exercise w/o limitations  No Q waves on EKG    Please note HPI is listed by problem with with update following it, it is copied again in the assessment above and reflects medical decision making as well  Complete 12 point ROS reviewed and otherwise non pertinent or negative except as per HPI pertinent positives in Cardiovascular and Respiratory emphasized   Please see paper chart for outpatient clinic patients where the patient completed the 12 point ROS survey  Past Medical History:   Diagnosis Date    Hypertension     Irregular heartbeat        No Known Allergies  I reviewed the Home Medication list and Allergies in the chart  Scheduled Meds:  Current Outpatient Prescriptions   Medication Sig Dispense Refill    amLODIPine-benazepril (LOTREL 5-10) 5-10 MG per capsule Take 1 capsule by mouth daily      aspirin (ECOTRIN LOW STRENGTH) 81 mg EC tablet Take 2 tablets by mouth daily for 30 days 60 tablet 0    Multiple Vitamins-Minerals (CENTRUM SILVER ADULT 50+) TABS Take 1 tablet by mouth      Multiple Vitamins-Minerals (ICAPS AREDS 2 PO) Take 1 capsule by mouth      sildenafil (VIAGRA) 25 MG tablet TAKE ONE HALF TABLET AS NEEDED  12    Soya Lecithin 1200 MG CAPS Take 1 capsule by mouth daily       No current facility-administered medications for this visit  PRN Meds:         Family History   Problem Relation Age of Onset    Heart disease Father        Social History     Social History    Marital status: /Civil Union     Spouse name: N/A    Number of children: 3    Years of education: N/A     Occupational History    Not on file  Social History Main Topics    Smoking status: Never Smoker    Smokeless tobacco: Never Used    Alcohol use Yes      Comment: 6 drinks a week    Drug use: No    Sexual activity: Not on file     Other Topics Concern    Not on file     Social History Narrative    No narrative on file         OBJECTIVE:    /90   Pulse 60   Resp 16   Ht 5' 10" (1 778 m)   Wt 89 8 kg (197 lb 14 4 oz)   BMI 28 40 kg/m²   Vitals:    09/07/18 1547   Weight: 89 8 kg (197 lb 14 4 oz)     GEN: No acute distress, Alert and oriented, well appearing  HEENT:Head, neck, ears, oral pharynx: Mucus membranes moist, oral pharynx clear, nares clear   External ears normal  EYES: Pupils equal, sclera anicteric, midline, normal conjuctiva  NECK: No JVD, supple, no obvious masses or thryomegaly or goiter  CARDIOVASCULAR:  RRR, No murmur, rub, gallops S1,S2  LUNGS: Clear To auscultation bilaterally, normal effort, no rales, rhonchi, crackles  ABDOMEN:  nondistended,  without obvious organomegaly or ascites  EXTREMITIES/VASCULAR:  No edema  Radial pulses intact, pedal pulses difficult to palpate, warm an well perfused  PSYCH: Normal Affect, no overt suicidal ideation, linear speech pattern without evidence of psychosis  NEURO: Grossly intact, moving all extremiteis equal, face symmetric, alert and responsive, no obvious focal defecits  GAIT:  Ambulates normally without difficulty  HEME: No bleeding, bruising, petechia, purpura  SKIN: No significant rashes, warm, no diaphoresis or pallor  Lab Results:       LABS:      Chemistry        Component Value Date/Time     2017 0712    K 4 1 2017 0712     2017 0712    CO2 28 2017 0712    BUN 17 2017 0712    CREATININE 1 14 2017 0712        Component Value Date/Time    CALCIUM 9 1 2017 0712    ALKPHOS 42 2017 0712    AST 23 2017 0712    ALT 15 2017 0712    BILITOT 1 0 2017 0712            Lab Results   Component Value Date    CHOL 187 2017     Lab Results   Component Value Date    HDL 54 2017     No results found for: [address]  Lab Results   Component Value Date    TRIG 83 2017     No components found for: CHOLHDL    IMAGING: No results found       Cardiac testing:   Results for orders placed during the hospital encounter of 16   Echo complete with contrast if indicated    Narrative [facility], [address] [facility]  [phone]    Transthoracic Echocardiogram  2D, M-mode, Doppler, and Color Doppler    Study date:  2016    Patient: Erika Brown  MR number: [id number]  Account number: [de-identified]  : 1960  Age: 60 years  Gender: Male  Status: Outpatient  Location: [facility]  Height: 72 in  Weight: 200 6 lb  BP: 138/ 90 mmHg    Indications: Atrial fibrillation    Diagnoses: I48 0 - Atrial fibrillation    Sonographer:  CORONA Gonzalez, RDCS  Primary Physician:  Emery Jimenez DO  Referring Physician:  Guy Johnson MD  Group:  [id number]  Interpreting Physician:  Corrie Canela MD    SUMMARY    LEFT VENTRICLE:  Size was normal   Systolic function was normal  Ejection fraction was estimated to be 60 %  There were no regional wall motion abnormalities  Wall thickness was mildly increased  There was mild concentric hypertrophy  LEFT ATRIUM:  The atrium was dilated  MITRAL VALVE:  There was mild to moderate regurgitation  AORTIC VALVE:  The valve was trileaflet  Leaflets exhibited mildly increased thickness  TRICUSPID VALVE:  There was mild regurgitation  HISTORY: PRIOR HISTORY: Hypertension    PROCEDURE: The study was performed in the [facility]  This was a routine study  The transthoracic approach was used  The study  included complete 2D imaging, M-mode, complete spectral Doppler, and color  Doppler  The heart rate was 50 bpm, at the start of the study  Images were  obtained from the parasternal, apical, subcostal, and suprasternal notch  acoustic windows  Image quality was good  LEFT VENTRICLE: Size was normal  Systolic function was normal  Ejection  fraction was estimated to be 60 %  There were no regional wall motion  abnormalities  Wall thickness was mildly increased  There was mild concentric  hypertrophy  RIGHT VENTRICLE: The size was normal  Systolic function was normal  Wall  thickness was normal     LEFT ATRIUM: The atrium was dilated  RIGHT ATRIUM: Size was normal     MITRAL VALVE: There was mild-moderate thickening  DOPPLER: There was mild to  moderate regurgitation  AORTIC VALVE: The valve was trileaflet  Leaflets exhibited mildly increased  thickness   DOPPLER: There was no evidence for stenosis  There was no  regurgitation  TRICUSPID VALVE: The valve structure was normal  There was normal leaflet  separation  DOPPLER: The transtricuspid velocity was within the normal range  There was no evidence for stenosis  There was mild regurgitation  PULMONIC VALVE: Leaflets exhibited normal thickness, no calcification, and  normal cuspal separation  DOPPLER: The transpulmonic velocity was within the  normal range  There was no regurgitation  PERICARDIUM: There was no pericardial effusion  The pericardium was normal in  appearance  AORTA: The root exhibited normal size      SYSTEM MEASUREMENT TABLES    2D  %FS: 43 29 %  AV Diam: 3 44 cm  EDV(Teich): 131 86 ml  EF(Cube): 81 76 %  EF(Teich): 74 06 %  ESV(Cube): 26 25 ml  ESV(Teich): 34 2 ml  IVSd: 1 34 cm  LA Area: 25 83 cm2  LA Diam: 4 07 cm  LVEDV MOD A4C: 96 41 ml  LVEF MOD A4C: 61 45 %  LVESV MOD A4C: 37 16 ml  LVIDd: 5 24 cm  LVIDs: 2 97 cm  LVLd A4C: 8 13 cm  LVLs A4C: 6 94 cm  LVPWd: 1 07 cm  RA Area: 17 94 cm2  RV Diam: 3 78 cm  SI(Cube): 54 99 ml/m2  SI(Teich): 45 63 ml/m2  SV MOD A4C: 59 24 ml  SV(Cube): 117 67 ml  SV(Teich): 97 65 ml    CW  TR MaxP 58 mmHg  TR Vmax: 2 37 m/s    MM  TAPSE: 3 1 cm    PW  E': 0 08 m/s  E/E': 11 06  MV A Valente: 0 69 m/s  MV Dec Tripp: 5 m/s2  MV DecT: 185 03 ms  MV E Valente: 0 93 m/s  MV E/A Ratio: 1 34    [employer] Accredited Echocardiography Laboratory    Prepared and electronically signed by    Khadijah Gonzalez MD  Signed [id number] 07:53:42       Results for orders placed during the hospital encounter of 17   ANITA    Narrative [address]  [facility], [address] [place] [address]  [phone]    Transesophageal Echocardiogram  2D, Doppler, and Color Doppler    Study date:  2017    Patient: Rosendo Brink  MR number: [id number]  Account number: [de-identified]  : 1960  Age: 59 years  Gender: Male  Status: Outpatient  Location: Cath lab  Height: 71 in  Weight: 196 lb  BP: 144/ 93 mmHg    Indications: Atrial Fibrillation Pre EP Procedure    Diagnoses: I48 0 - Atrial fibrillation    Sonographer:  Kenney Bella RN, RCS  Primary Physician:  Maru Cho DO  Referring Physician:  Delaney Gómez MD  Group:  [facility]  Interpreting Physician:  Delaney Gómez MD    SUMMARY    LEFT VENTRICLE:  Systolic function was normal  Ejection fraction was estimated to be 65 %  Wall thickness was mildly increased  LEFT ATRIUM:  Size was normal   No thrombus was identified  LEFT ATRIAL APPENDAGE:  The size was normal   The function was normal (normal emptying velocity)  No thrombus was identified  ATRIAL SEPTUM:  No defect or patent foramen ovale was identified  MITRAL VALVE:  There was mild regurgitation  HISTORY: PRIOR HISTORY: Hypertension, Atrial Fibrillation    PROCEDURE: The procedure was performed in the catheterization laboratory  This was a routine study  The risks and alternatives of the procedure were explained to the patient and informed consent was obtained  The transesophageal approach  was used  The study included complete 2D imaging, complete spectral Doppler, and color Doppler  The heart rate was 56 bpm, at the start of the study  An adult omniplane probe was inserted by the attending cardiologist  Intubated with ease  One intubation attempt(s)  There was no blood detected on the probe  Image quality was excellent  MEDICATIONS: General anesthesia administered by anesthesia team     LEFT VENTRICLE: Size was normal  Systolic function was normal  Ejection fraction was estimated to be 65 %  Wall thickness was mildly increased  LEFT ATRIUM: Size was normal  No thrombus was identified  APPENDAGE: The size was normal  No thrombus was identified  DOPPLER: The function was normal (normal emptying velocity)  ATRIAL SEPTUM: No defect or patent foramen ovale was identified      RIGHT ATRIUM: Size was normal  No thrombus was identified  MITRAL VALVE: Valve structure was normal  There was normal leaflet separation  There was no echocardiographic evidence of vegetation  DOPPLER: There was mild regurgitation  AORTIC VALVE: The valve was trileaflet  Leaflets exhibited normal thickness and normal cuspal separation  There was no echocardiographic evidence of vegetation  DOPPLER: There was no regurgitation  TRICUSPID VALVE: The valve structure was normal  There was normal leaflet separation  There was no echocardiographic evidence of vegetation  DOPPLER: There was no regurgitation  PERICARDIUM: There was no pericardial effusion  The pericardium was normal in appearance  AORTA: The root exhibited normal size  There was no atheroma  There was no evidence for dissection  There was no evidence for aneurysm  [address] Accredited Echocardiography Laboratory    Prepared and electronically signed by    Sharifa Pan MD  Signed 01-Jun-2017 13:06:36       No results found for this or any previous visit  No results found for this or any previous visit

## 2018-10-05 ENCOUNTER — IMMUNIZATION (OUTPATIENT)
Dept: INTERNAL MEDICINE CLINIC | Facility: CLINIC | Age: 58
End: 2018-10-05
Payer: COMMERCIAL

## 2018-10-05 DIAGNOSIS — Z23 NEED FOR INFLUENZA VACCINATION: Primary | ICD-10-CM

## 2018-10-05 PROCEDURE — 90471 IMMUNIZATION ADMIN: CPT

## 2018-10-05 PROCEDURE — 90682 RIV4 VACC RECOMBINANT DNA IM: CPT

## 2018-10-07 DIAGNOSIS — I10 ESSENTIAL HYPERTENSION: Primary | ICD-10-CM

## 2018-10-07 RX ORDER — AMLODIPINE BESYLATE AND BENAZEPRIL HYDROCHLORIDE 5; 10 MG/1; MG/1
CAPSULE ORAL
Qty: 90 CAPSULE | Refills: 3 | Status: SHIPPED | OUTPATIENT
Start: 2018-10-07 | End: 2019-11-15 | Stop reason: SDUPTHER

## 2018-11-13 ENCOUNTER — REMOTE DEVICE CLINIC VISIT (OUTPATIENT)
Dept: CARDIOLOGY CLINIC | Facility: CLINIC | Age: 58
End: 2018-11-13
Payer: COMMERCIAL

## 2018-11-13 DIAGNOSIS — I48.20 CHRONIC ATRIAL FIBRILLATION (HCC): Primary | ICD-10-CM

## 2018-11-13 DIAGNOSIS — Z95.818 PRESENCE OF OTHER CARDIAC IMPLANTS AND GRAFTS: ICD-10-CM

## 2018-11-13 PROCEDURE — 93299 PR REM INTERROG ICPMS/SCRMS <30 D TECH REVIEW: CPT | Performed by: INTERNAL MEDICINE

## 2018-11-13 PROCEDURE — 93298 REM INTERROG DEV EVAL SCRMS: CPT | Performed by: INTERNAL MEDICINE

## 2018-11-13 NOTE — PROGRESS NOTES
MDT LOOP  CARELINK TRANSMISSION: LOOP RECORDER  PRESENTING RHYTHM NSR @ 78 BPM  BATTERY STATUS "OK"  1 AF EPISODE W/ EGRAM SHOWING PROBABLE SR W/ PACs @ 125 BPM FOR 1:04 HOURS  HOWEVER CAN NOT R/O AF DUE TO NOISE  HX OF AF  AF BURDEN = 0%  PT TAKES ASA 81  NO PATIENT ACTIVATED EPISODES  NORMAL DEVICE FUNCTION   DL

## 2019-02-20 ENCOUNTER — REMOTE DEVICE CLINIC VISIT (OUTPATIENT)
Dept: CARDIOLOGY CLINIC | Facility: CLINIC | Age: 59
End: 2019-02-20
Payer: COMMERCIAL

## 2019-02-20 DIAGNOSIS — I48.20 CHRONIC ATRIAL FIBRILLATION (HCC): Primary | ICD-10-CM

## 2019-02-20 DIAGNOSIS — Z95.818 PRESENCE OF OTHER CARDIAC IMPLANTS AND GRAFTS: ICD-10-CM

## 2019-02-20 PROCEDURE — 93299 PR REM INTERROG ICPMS/SCRMS <30 D TECH REVIEW: CPT | Performed by: INTERNAL MEDICINE

## 2019-02-20 PROCEDURE — 93298 REM INTERROG DEV EVAL SCRMS: CPT | Performed by: INTERNAL MEDICINE

## 2019-02-20 NOTE — PROGRESS NOTES
MDT LOOP  CARELINK TRANSMISSION: LOOP RECORDER  PRESENTING RHYTHM NSR @ 98 BPM  BATTERY STATUS "OK"  NO PATIENT OR DEVICE ACTIVATED EPISODES  NORMAL DEVICE FUNCTION   DL

## 2019-05-30 ENCOUNTER — REMOTE DEVICE CLINIC VISIT (OUTPATIENT)
Dept: CARDIOLOGY CLINIC | Facility: CLINIC | Age: 59
End: 2019-05-30
Payer: COMMERCIAL

## 2019-05-30 DIAGNOSIS — Z45.09 ENCOUNTER FOR LOOP RECORDER CHECK: Primary | ICD-10-CM

## 2019-05-30 DIAGNOSIS — I48.20 CHRONIC ATRIAL FIBRILLATION (HCC): ICD-10-CM

## 2019-05-30 PROCEDURE — 93296 REM INTERROG EVL PM/IDS: CPT | Performed by: INTERNAL MEDICINE

## 2019-05-30 PROCEDURE — 93298 REM INTERROG DEV EVAL SCRMS: CPT | Performed by: INTERNAL MEDICINE

## 2019-08-21 ENCOUNTER — REMOTE DEVICE CLINIC VISIT (OUTPATIENT)
Dept: CARDIOLOGY CLINIC | Facility: CLINIC | Age: 59
End: 2019-08-21
Payer: COMMERCIAL

## 2019-08-21 DIAGNOSIS — Z95.818 PRESENCE OF CARDIAC DEVICE: Primary | ICD-10-CM

## 2019-08-21 PROCEDURE — 93298 REM INTERROG DEV EVAL SCRMS: CPT | Performed by: INTERNAL MEDICINE

## 2019-08-21 PROCEDURE — 93299 PR REM INTERROG ICPMS/SCRMS <30 D TECH REVIEW: CPT | Performed by: INTERNAL MEDICINE

## 2019-08-21 NOTE — PROGRESS NOTES
Results for orders placed or performed in visit on 08/21/19   Cardiac EP device report    Narrative    MDT LOOP  CARELINK TRANSMISSION: BATTERY STATUS "OK"  NO PATIENT OR DEVICE ACTIVATED EPISODES  ---[place]

## 2019-08-30 ENCOUNTER — CLINICAL SUPPORT (OUTPATIENT)
Dept: INTERNAL MEDICINE CLINIC | Facility: CLINIC | Age: 59
End: 2019-08-30
Payer: COMMERCIAL

## 2019-08-30 DIAGNOSIS — Z23 NEED FOR ZOSTER VACCINATION: Primary | ICD-10-CM

## 2019-08-30 PROCEDURE — 90471 IMMUNIZATION ADMIN: CPT

## 2019-08-30 PROCEDURE — 90750 HZV VACC RECOMBINANT IM: CPT

## 2019-09-09 ENCOUNTER — OFFICE VISIT (OUTPATIENT)
Dept: INTERNAL MEDICINE CLINIC | Facility: CLINIC | Age: 59
End: 2019-09-09
Payer: COMMERCIAL

## 2019-09-09 ENCOUNTER — APPOINTMENT (OUTPATIENT)
Dept: LAB | Facility: CLINIC | Age: 59
End: 2019-09-09
Payer: COMMERCIAL

## 2019-09-09 VITALS
RESPIRATION RATE: 14 BRPM | BODY MASS INDEX: 28.17 KG/M2 | OXYGEN SATURATION: 96 % | SYSTOLIC BLOOD PRESSURE: 122 MMHG | HEART RATE: 67 BPM | TEMPERATURE: 97.7 F | HEIGHT: 71 IN | WEIGHT: 201.2 LBS | DIASTOLIC BLOOD PRESSURE: 84 MMHG

## 2019-09-09 DIAGNOSIS — Z00.00 ANNUAL PHYSICAL EXAM: ICD-10-CM

## 2019-09-09 DIAGNOSIS — Z00.00 ANNUAL PHYSICAL EXAM: Primary | ICD-10-CM

## 2019-09-09 DIAGNOSIS — Z13.21 ENCOUNTER FOR SCREENING FOR NUTRITIONAL DISORDER: ICD-10-CM

## 2019-09-09 DIAGNOSIS — Z12.5 SCREENING PSA (PROSTATE SPECIFIC ANTIGEN): ICD-10-CM

## 2019-09-09 DIAGNOSIS — Z23 NEED FOR TETANUS BOOSTER: ICD-10-CM

## 2019-09-09 LAB
25(OH)D3 SERPL-MCNC: 31 NG/ML (ref 30–100)
ALBUMIN SERPL BCP-MCNC: 4.3 G/DL (ref 3.5–5)
ALP SERPL-CCNC: 46 U/L (ref 46–116)
ALT SERPL W P-5'-P-CCNC: 30 U/L (ref 12–78)
ANION GAP SERPL CALCULATED.3IONS-SCNC: 6 MMOL/L (ref 4–13)
AST SERPL W P-5'-P-CCNC: 23 U/L (ref 5–45)
BASOPHILS # BLD AUTO: 0.01 THOUSANDS/ΜL (ref 0–0.1)
BASOPHILS NFR BLD AUTO: 0 % (ref 0–1)
BILIRUB SERPL-MCNC: 0.92 MG/DL (ref 0.2–1)
BUN SERPL-MCNC: 21 MG/DL (ref 5–25)
CALCIUM SERPL-MCNC: 9.4 MG/DL (ref 8.3–10.1)
CHLORIDE SERPL-SCNC: 105 MMOL/L (ref 100–108)
CHOLEST SERPL-MCNC: 196 MG/DL (ref 50–200)
CO2 SERPL-SCNC: 29 MMOL/L (ref 21–32)
CREAT SERPL-MCNC: 1.23 MG/DL (ref 0.6–1.3)
EOSINOPHIL # BLD AUTO: 0.12 THOUSAND/ΜL (ref 0–0.61)
EOSINOPHIL NFR BLD AUTO: 3 % (ref 0–6)
ERYTHROCYTE [DISTWIDTH] IN BLOOD BY AUTOMATED COUNT: 12.4 % (ref 11.6–15.1)
GFR SERPL CREATININE-BSD FRML MDRD: 63 ML/MIN/1.73SQ M
GLUCOSE P FAST SERPL-MCNC: 95 MG/DL (ref 65–99)
HCT VFR BLD AUTO: 45.9 % (ref 36.5–49.3)
HDLC SERPL-MCNC: 45 MG/DL (ref 40–60)
HGB BLD-MCNC: 15.5 G/DL (ref 12–17)
IMM GRANULOCYTES # BLD AUTO: 0.01 THOUSAND/UL (ref 0–0.2)
IMM GRANULOCYTES NFR BLD AUTO: 0 % (ref 0–2)
LDLC SERPL CALC-MCNC: 125 MG/DL (ref 0–100)
LYMPHOCYTES # BLD AUTO: 1.13 THOUSANDS/ΜL (ref 0.6–4.47)
LYMPHOCYTES NFR BLD AUTO: 30 % (ref 14–44)
MCH RBC QN AUTO: 31.1 PG (ref 26.8–34.3)
MCHC RBC AUTO-ENTMCNC: 33.8 G/DL (ref 31.4–37.4)
MCV RBC AUTO: 92 FL (ref 82–98)
MONOCYTES # BLD AUTO: 0.37 THOUSAND/ΜL (ref 0.17–1.22)
MONOCYTES NFR BLD AUTO: 10 % (ref 4–12)
NEUTROPHILS # BLD AUTO: 2.13 THOUSANDS/ΜL (ref 1.85–7.62)
NEUTS SEG NFR BLD AUTO: 57 % (ref 43–75)
NONHDLC SERPL-MCNC: 151 MG/DL
NRBC BLD AUTO-RTO: 0 /100 WBCS
PLATELET # BLD AUTO: 222 THOUSANDS/UL (ref 149–390)
PMV BLD AUTO: 9.9 FL (ref 8.9–12.7)
POTASSIUM SERPL-SCNC: 3.9 MMOL/L (ref 3.5–5.3)
PROT SERPL-MCNC: 7.5 G/DL (ref 6.4–8.2)
RBC # BLD AUTO: 4.98 MILLION/UL (ref 3.88–5.62)
SODIUM SERPL-SCNC: 140 MMOL/L (ref 136–145)
TRIGL SERPL-MCNC: 129 MG/DL
WBC # BLD AUTO: 3.77 THOUSAND/UL (ref 4.31–10.16)

## 2019-09-09 PROCEDURE — 36415 COLL VENOUS BLD VENIPUNCTURE: CPT

## 2019-09-09 PROCEDURE — 90715 TDAP VACCINE 7 YRS/> IM: CPT

## 2019-09-09 PROCEDURE — 85025 COMPLETE CBC W/AUTO DIFF WBC: CPT

## 2019-09-09 PROCEDURE — 99396 PREV VISIT EST AGE 40-64: CPT | Performed by: NURSE PRACTITIONER

## 2019-09-09 PROCEDURE — 80053 COMPREHEN METABOLIC PANEL: CPT

## 2019-09-09 PROCEDURE — 84154 ASSAY OF PSA FREE: CPT

## 2019-09-09 PROCEDURE — 82306 VITAMIN D 25 HYDROXY: CPT

## 2019-09-09 PROCEDURE — 84153 ASSAY OF PSA TOTAL: CPT

## 2019-09-09 PROCEDURE — 90471 IMMUNIZATION ADMIN: CPT

## 2019-09-09 PROCEDURE — 80061 LIPID PANEL: CPT

## 2019-09-09 NOTE — PATIENT INSTRUCTIONS

## 2019-09-09 NOTE — PROGRESS NOTES
ADULT ANNUAL PHYSICAL  [facility] - [facility]    NAME: Juanpablo Jain  AGE: 62 y o  SEX: male  : 1960     DATE: 2019     Assessment and Plan:     Problem List Items Addressed This Visit     None      Visit Diagnoses     Annual physical exam    -  Primary    Relevant Orders    Lipid panel (Completed)    Comprehensive metabolic panel (Completed)    CBC and differential (Completed)    Need for tetanus booster        Relevant Orders    TDAP VACCINE GREATER THAN OR EQUAL TO 8YO IM (Completed)    Screening PSA (prostate specific antigen)        Relevant Orders    PSA, total and free (Completed)    Encounter for screening for nutritional disorder        Relevant Orders    Vitamin D 25 hydroxy (Completed)          Immunizations and preventive care screenings were discussed with patient today  Appropriate education was printed on patient's after visit summary  Counseling:  · BMI Counseling: Body mass index is 28 3 kg/m²  Discussed the patient's BMI with him  The BMI is above average  BMI counseling and education was provided to the patient  Nutrition recommendations include reducing portion sizes and decreasing overall calorie intake  Exercise recommendations include exercising 3-5 times per week  Return in about 1 year (around 2020) for Annual physical      Chief Complaint:     Chief Complaint   Patient presents with    Annual Exam      History of Present Illness:     Adult Annual Physical   Patient here for a comprehensive physical exam  The patient reports no problems  Diet and Physical Activity  · Diet/Nutrition: well balanced diet  · Exercise: no formal exercise        Depression Screening  PHQ-9 Depression Screening    PHQ-9:    Frequency of the following problems over the past two weeks:       Little interest or pleasure in doing things:  0 - not at all  Feeling down, depressed, or hopeless:  0 - not at all  PHQ-2 Score:  0       General Health  · Sleep: sleeps well  · Hearing: normal - bilateral   · Vision: no vision problems  · Dental: regular dental visits   Health  · Symptoms include: none     Review of Systems:     Review of Systems   Constitutional: Negative  HENT: Negative  Eyes: Negative  Respiratory: Negative  Cardiovascular: Negative  Gastrointestinal: Negative  Musculoskeletal: Negative  Neurological: Negative         Past Medical History:     Past Medical History:   Diagnosis Date    Hypertension     Irregular heartbeat       Past Surgical History:     Past Surgical History:   Procedure Laterality Date    INGUINAL HERNIA REPAIR Left     TONSILLECTOMY      TOOTH EXTRACTION        Family History:     Family History   Problem Relation Age of Onset    Heart disease Father       Social History:     Social History     Socioeconomic History    Marital status: /Civil Union     Spouse name: Not on file    Number of children: 3    Years of education: Not on file    Highest education level: Not on file   Occupational History    Not on file   Social Needs    Financial resource strain: Not on file    Food insecurity:     Worry: Not on file     Inability: Not on file    Transportation needs:     Medical: Not on file     Non-medical: Not on file   Tobacco Use    Smoking status: Never Smoker    Smokeless tobacco: Never Used   Substance and Sexual Activity    Alcohol use: Yes     Comment: 6 drinks a week    Drug use: No    Sexual activity: Not on file   Lifestyle    Physical activity:     Days per week: Not on file     Minutes per session: Not on file    Stress: Not on file   Relationships    Social connections:     Talks on phone: Not on file     Gets together: Not on file     Attends Caodaism service: Not on file     Active member of club or organization: Not on file     Attends meetings of clubs or organizations: Not on file     Relationship status: Not on file    Intimate partner violence:     Fear of current or ex partner: Not on file     Emotionally abused: Not on file     Physically abused: Not on file     Forced sexual activity: Not on file   Other Topics Concern    Not on file   Social History Narrative    Not on file      Current Medications:     Current Outpatient Medications   Medication Sig Dispense Refill    amLODIPine-benazepril (LOTREL 5-10) 5-10 MG per capsule TAKE ONE CAPSULE BY MOUTH EVERY DAY 90 capsule 3    aspirin (ECOTRIN LOW STRENGTH) 81 mg EC tablet Take 2 tablets by mouth daily for 30 days 60 tablet 0    Multiple Vitamins-Minerals (CENTRUM SILVER ADULT 50+) TABS Take 1 tablet by mouth      sildenafil (VIAGRA) 25 MG tablet TAKE ONE HALF TABLET AS NEEDED  12     No current facility-administered medications for this visit  Allergies:     No Known Allergies   Physical Exam:     /84   Pulse 67   Temp 97 7 °F (36 5 °C) (Oral)   Resp 14   Ht 5' 10 7" (1 796 m)   Wt 91 3 kg (201 lb 3 2 oz)   SpO2 96%   BMI 28 30 kg/m²     Physical Exam   Constitutional: He is oriented to person, place, and time  He appears well-developed and well-nourished  HENT:   Head: Normocephalic and atraumatic  Right Ear: External ear normal    Left Ear: External ear normal    Nose: Nose normal    Mouth/Throat: Oropharynx is clear and moist    Eyes: Pupils are equal, round, and reactive to light  Conjunctivae are normal    Neck: Normal range of motion  Neck supple  Cardiovascular: Normal rate and regular rhythm  Pulmonary/Chest: Effort normal and breath sounds normal    Abdominal: Soft  Bowel sounds are normal    Musculoskeletal: Normal range of motion  Neurological: He is alert and oriented to person, place, and time  Skin: Skin is warm and dry  Nursing note and vitals reviewed        GENA Blanco  [facility]

## 2019-09-10 LAB
PSA FREE MFR SERPL: 41.7 %
PSA FREE SERPL-MCNC: 0.25 NG/ML
PSA SERPL-MCNC: 0.6 NG/ML (ref 0–4)

## 2019-10-29 NOTE — PROGRESS NOTES
Cardiology Follow Up    Jesus Jazlyn  1960  [id number]  HEART & VASCULAR [facility] [place]  [address]        Assessment/Plan     1  Paroxysmal atrial fibrillation (HCC)  POCT ECG   2  Dyslipidemia     3  Essential hypertension         ASSESSMENT:  1, Paroxysmal atrial fibrillation and atrial flutter with 2:1 conduction, status post cryoablation with pulmonary vein isolation and typical flutter line 6/2017   A ) relatively asymptomatic, however there was concern for tachy-mediated cardiomyopathy   B ) [employer] loop recorder in situ   C ) CHADS2 Vasc = 1, maintained on aspirin  2  Normal LV systolic function per ANITA 6/2017  3  Hypertension  4  Elevated BMI 29    DISCUSSION/SUMMARY:  1  Paroxysmal atrial fibrillation and atrial flutter, status post ablation 06/2017 - he is doing well from a cardiac standpoint, and denies recurrent episodes  Loop recorder interrogation today showed no recent episodes of atrial fibrillation or flutter, however it appears he likely had atrial fibrillation 11/2018  No changes will be made to his regimen at this time, and we will continue to monitor for recurrent arrhythmias through routine device interrogations  He has actually questioning if and when his loop recorder can be explanted  I assured him that I will discuss this with Dr Antionette Berman  I explained that we would like to keep this in until the battery runs out to obtain as much information as possible in regards to recurrent arrhythmias and the need for anticoagulation moving forward  He does understand this, and would be willing to leave the device in place if we felt it was necessary  2  Normal LV systolic function - he is euvolemic on exam, with no signs or symptoms consistent with congestive heart failure  No further workup is needed at this time  3  Hypertension - well controlled on amlodipine/benazepril  He will continue this regimen      4  Dyslipidemia - he reports being on Lipitor in the past, however with lifestyle and dietary modifications he was able to come off this medication  Lipid panel from 10/2019 showed total cholesterol 196, triglycerides 129, HDL 45, and   This is primarily managed by his primary physician  He will continue to follow-up on a yearly basis, but knows to contact us in the meantime with any questions, concerns, or changes in symptoms  We will continue to monitor his loop recorder through routine interrogations  History of Present Illness     HPI/INTERVAL HISTORY: Marychuy Vaughn is a 62 y o  male with a history of preserved LV systolic function, hypertension, and paroxysmal atrial fibrillation  He had a [employer] loop recorder in situ, and was ultimately found to have atrial flutter with 2:1 conduction  While he was largely asymptomatic, there was concern for the development of a tachycardia mediated cardiomyopathy  Thus an ablation was recommended  In June 2017 he underwent cryoablation with pulmonary vein isolation as well as typical flutter line  He has done well in the post ablation setting  He was initially maintained on anticoagulation, however this was ultimately discontinued  He has a CHADS2 Vasc = 1, is currently maintained on aspirin  He typically follows with Dr Tammy Barton, and is being seen today in routine follow-up  He has been doing well from a cardiac standpoint  He denies chest pain or pressure, shortness of breath at rest or with exertion, palpitations, dizziness, lightheadedness, presyncope, syncope, or issues with edema  Historically, he was not always aware of his atrial fibrillation, and was actually discovered incidentally at the time of a colonoscopy  He now thinks he would be able to recognize his arrhythmias, but he does have a loop recorder in situ for closer monitoring        Review of Systems  ROS as noted above, otherwise 12 point review of systems was performed and is negative         Historical Information   Social History     Socioeconomic History    Marital status: /Civil Union     Spouse name: Not on file    Number of children: 3    Years of education: Not on file    Highest education level: Not on file   Occupational History    Not on file   Social Needs    Financial resource strain: Not on file    Food insecurity:     Worry: Not on file     Inability: Not on file   [employer] needs:     Medical: Not on file     Non-medical: Not on file   Tobacco Use    Smoking status: Never Smoker    Smokeless tobacco: Never Used   Substance and Sexual Activity    Alcohol use: Yes     Comment: 6 drinks a week    Drug use: No    Sexual activity: Not on file   Lifestyle    Physical activity:     Days per week: Not on file     Minutes per session: Not on file    Stress: Not on file   Relationships    Social connections:     Talks on phone: Not on file     Gets together: Not on file     Attends Hindu service: Not on file     Active member of club or organization: Not on file     Attends meetings of clubs or organizations: Not on file     Relationship status: Not on file    Intimate partner violence:     Fear of current or ex partner: Not on file     Emotionally abused: Not on file     Physically abused: Not on file     Forced sexual activity: Not on file   Other Topics Concern    Not on file   Social History Narrative    Not on file     Past Medical History:   Diagnosis Date    Hypertension     Irregular heartbeat      Past Surgical History:   Procedure Laterality Date    INGUINAL HERNIA REPAIR Left     TONSILLECTOMY      TOOTH EXTRACTION       Social History     Substance and Sexual Activity   Alcohol Use Yes    Comment: 6 drinks a week     Social History     Substance and Sexual Activity   Drug Use No     Social History     Tobacco Use   Smoking Status Never Smoker   Smokeless Tobacco Never Used     Family History   Problem Relation Age of Onset  Heart disease Father        Meds/Allergies       Current Outpatient Medications:     amLODIPine-benazepril (LOTREL 5-10) 5-10 MG per capsule, TAKE ONE CAPSULE BY MOUTH EVERY DAY, Disp: 90 capsule, Rfl: 3    Multiple Vitamins-Minerals (CENTRUM SILVER ADULT 50+) TABS, Take 1 tablet by mouth, Disp: , Rfl:     sildenafil (VIAGRA) 25 MG tablet, TAKE ONE HALF TABLET AS NEEDED, Disp: , Rfl: 12    aspirin (ECOTRIN LOW STRENGTH) 81 mg EC tablet, Take 2 tablets by mouth daily for 30 days, Disp: 60 tablet, Rfl: 0    No Known Allergies    Objective   Vitals: Blood pressure 130/84, pulse 62, height 5' 10" (1 778 m), weight 92 1 kg (203 lb)          Physical Exam  GEN: NAD, alert and oriented, well appearing  SKIN: dry without significant lesions or rashes  HEENT: NCAT, PERRL, EOMs intact  NECK: No JVD appreciated  CARDIOVASCULAR: RRR, normal S1, S2 without murmurs, rubs, or gallops appreciated  LUNGS: Clear to auscultation bilaterally without wheezes, rhonchi, or rales  ABDOMEN: Soft, nontender, nondistended  EXTREMITIES/VASCULAR: perfused without clubbing, cyanosis, or edema b/l  PSYCH: Normal mood and affect  NEURO: CN ll-Xll grossly intact        Labs:  Appointment on 09/09/2019   Component Date Value    Cholesterol 09/09/2019 196     Triglycerides 09/09/2019 129     HDL, Direct 09/09/2019 45     LDL Calculated 09/09/2019 125*    Non-HDL-Chol (CHOL-HDL) 09/09/2019 151     Prostate Specific Antige* 09/09/2019 0 6     PSA, Free 09/09/2019 0 25     PSA, Free Pct 09/09/2019 41 7     Vit D, 25-Hydroxy 09/09/2019 31 0     Sodium 09/09/2019 140     Potassium 09/09/2019 3 9     Chloride 09/09/2019 105     CO2 09/09/2019 29     ANION GAP 09/09/2019 6     BUN 09/09/2019 21     Creatinine 09/09/2019 1 23     Glucose, Fasting 09/09/2019 95     Calcium 09/09/2019 9 4     AST 09/09/2019 23     ALT 09/09/2019 30     Alkaline Phosphatase 09/09/2019 46     Total Protein 09/09/2019 7 5     Albumin 09/09/2019 4 3  Total Bilirubin 2019 0 92     eGFR 2019 63     WBC 2019 3 77*    RBC 2019 4 98     Hemoglobin 2019 15 5     Hematocrit 2019 45 9     MCV 2019 92     MCH 2019 31 1     MCHC 2019 33 8     RDW 2019 12 4     MPV 2019 9 9     Platelets  222     nRBC 2019 0     Neutrophils Relative 2019 57     Immat GRANS % 2019 0     Lymphocytes Relative 2019 30     Monocytes Relative 2019 10     Eosinophils Relative 2019 3     Basophils Relative 2019 0     Neutrophils Absolute 2019 2 13     Immature Grans Absolute 2019 0 01     Lymphocytes Absolute 2019 1 13     Monocytes Absolute 2019 0 37     Eosinophils Absolute 2019 0 12     Basophils Absolute 2019 0 01        Imaging: I have personally reviewed pertinent reports  ECHO:   Results for orders placed during the hospital encounter of 16   Echo complete with contrast if indicated    Narrative [facility], [address] [facility]  [phone]    Transthoracic Echocardiogram  2D, M-mode, Doppler, and Color Doppler    Study date:  2016    Patient: Vika Perez  MR number: [id number]  Account number: [de-identified]  : 1960  Age: 60 years  Gender: Male  Status: Outpatient  Location: [facility] [place]  Height: 72 in  Weight: 200 6 lb  BP: 138/ 90 mmHg    Indications: Atrial fibrillation    Diagnoses: I48 0 - Atrial fibrillation    Sonographer:  CORONA Berman, RDCS  Primary Physician:  Aroldo Tadeo DO  Referring Physician:  Jose Sheehan MD  Group:  [facility]  Interpreting Physician:  Andry Hanna MD    SUMMARY    LEFT VENTRICLE:  Size was normal   Systolic function was normal  Ejection fraction was estimated to be 60 %  There were no regional wall motion abnormalities    Wall thickness was mildly increased  There was mild concentric hypertrophy  LEFT ATRIUM:  The atrium was dilated  MITRAL VALVE:  There was mild to moderate regurgitation  AORTIC VALVE:  The valve was trileaflet  Leaflets exhibited mildly increased thickness  TRICUSPID VALVE:  There was mild regurgitation  HISTORY: PRIOR HISTORY: Hypertension    PROCEDURE: The study was performed in the [facility]  This was a routine study  The transthoracic approach was used  The study  included complete 2D imaging, M-mode, complete spectral Doppler, and color  Doppler  The heart rate was 50 bpm, at the start of the study  Images were  obtained from the parasternal, apical, subcostal, and suprasternal notch  acoustic windows  Image quality was good  LEFT VENTRICLE: Size was normal  Systolic function was normal  Ejection  fraction was estimated to be 60 %  There were no regional wall motion  abnormalities  Wall thickness was mildly increased  There was mild concentric  hypertrophy  RIGHT VENTRICLE: The size was normal  Systolic function was normal  Wall  thickness was normal     LEFT ATRIUM: The atrium was dilated  RIGHT ATRIUM: Size was normal     MITRAL VALVE: There was mild-moderate thickening  DOPPLER: There was mild to  moderate regurgitation  AORTIC VALVE: The valve was trileaflet  Leaflets exhibited mildly increased  thickness  DOPPLER: There was no evidence for stenosis  There was no  regurgitation  TRICUSPID VALVE: The valve structure was normal  There was normal leaflet  separation  DOPPLER: The transtricuspid velocity was within the normal range  There was no evidence for stenosis  There was mild regurgitation  PULMONIC VALVE: Leaflets exhibited normal thickness, no calcification, and  normal cuspal separation  DOPPLER: The transpulmonic velocity was within the  normal range  There was no regurgitation  PERICARDIUM: There was no pericardial effusion   The pericardium was normal in  appearance  AORTA: The root exhibited normal size  SYSTEM MEASUREMENT TABLES    2D  %FS: 43 29 %  AV Diam: 3 44 cm  EDV(Teich): 131 86 ml  EF(Cube): 81 76 %  EF(Teich): 74 06 %  ESV(Cube): 26 25 ml  ESV(Teich): 34 2 ml  IVSd: 1 34 cm  LA Area: 25 83 cm2  LA Diam: 4 07 cm  LVEDV MOD A4C: 96 41 ml  LVEF MOD A4C: 61 45 %  LVESV MOD A4C: 37 16 ml  LVIDd: 5 24 cm  LVIDs: 2 97 cm  LVLd A4C: 8 13 cm  LVLs A4C: 6 94 cm  LVPWd: 1 07 cm  RA Area: 17 94 cm2  RV Diam: 3 78 cm  SI(Cube): 54 99 ml/m2  SI(Teich): 45 63 ml/m2  SV MOD A4C: 59 24 ml  SV(Cube): 117 67 ml  SV(Teich): 97 65 ml    CW  TR MaxP 58 mmHg  TR Vmax: 2 37 m/s    MM  TAPSE: 3 1 cm    PW  E': 0 08 m/s  E/E': 11 06  MV A Valente: 0 69 m/s  MV Dec Aguada: 5 m/s2  MV DecT: 185 03 ms  MV E Valente: 0 93 m/s  MV E/A Ratio: 1 34    [facility] Accredited Echocardiography [facility]    Prepared and electronically signed by    Dominick Roblero MD  Signed [id number] 07:53:42         CATH/STRESS TEST: no prior studies noted      EKG:  Normal sinus rhythm, heart rate 62 beats per minute, normal axis, no acute ischemia      LOOP RECORDER INTERROGATION:   I personally interrogated his device today, which showed no arrhythmias since a prior check in 2019  Looking back, it does appear that he had SVT and likely brief episodes of atrial fibrillation however no recent episodes  AT/AF burden less than 0 1%

## 2019-10-30 ENCOUNTER — OFFICE VISIT (OUTPATIENT)
Dept: CARDIOLOGY CLINIC | Facility: CLINIC | Age: 59
End: 2019-10-30
Payer: COMMERCIAL

## 2019-10-30 VITALS
DIASTOLIC BLOOD PRESSURE: 84 MMHG | BODY MASS INDEX: 29.06 KG/M2 | SYSTOLIC BLOOD PRESSURE: 130 MMHG | HEART RATE: 62 BPM | WEIGHT: 203 LBS | HEIGHT: 70 IN

## 2019-10-30 DIAGNOSIS — E78.5 DYSLIPIDEMIA: ICD-10-CM

## 2019-10-30 DIAGNOSIS — I10 ESSENTIAL HYPERTENSION: ICD-10-CM

## 2019-10-30 DIAGNOSIS — I48.0 PAROXYSMAL ATRIAL FIBRILLATION (HCC): Primary | ICD-10-CM

## 2019-10-30 PROCEDURE — 93000 ELECTROCARDIOGRAM COMPLETE: CPT | Performed by: PHYSICIAN ASSISTANT

## 2019-10-30 PROCEDURE — 3079F DIAST BP 80-89 MM HG: CPT | Performed by: PHYSICIAN ASSISTANT

## 2019-10-30 PROCEDURE — 3075F SYST BP GE 130 - 139MM HG: CPT | Performed by: PHYSICIAN ASSISTANT

## 2019-10-30 PROCEDURE — 99214 OFFICE O/P EST MOD 30 MIN: CPT | Performed by: PHYSICIAN ASSISTANT

## 2019-10-30 NOTE — Clinical Note
This patient was asking again when in if he can have his loop recorder removed  I again explained that we are monitoring for asymptomatic AFib  He would be willing to leave it in place  It is not bothering him, he was more worried from an insurance standpoint because he would like to retire early next year  I assured him I would ask you, but I know in the past you asked him to leave it in until it reaches end of service

## 2019-11-01 ENCOUNTER — CLINICAL SUPPORT (OUTPATIENT)
Dept: INTERNAL MEDICINE CLINIC | Facility: CLINIC | Age: 59
End: 2019-11-01
Payer: COMMERCIAL

## 2019-11-01 DIAGNOSIS — Z23 NEED FOR ZOSTER VACCINATION: Primary | ICD-10-CM

## 2019-11-01 PROCEDURE — 90471 IMMUNIZATION ADMIN: CPT

## 2019-11-01 PROCEDURE — 90750 HZV VACC RECOMBINANT IM: CPT

## 2019-11-01 NOTE — PROGRESS NOTES
The patient presents for his second Shingrix injection  The patient stated that he had no reaction from the first injection

## 2019-11-15 DIAGNOSIS — I10 ESSENTIAL HYPERTENSION: ICD-10-CM

## 2019-11-17 RX ORDER — AMLODIPINE BESYLATE AND BENAZEPRIL HYDROCHLORIDE 5; 10 MG/1; MG/1
CAPSULE ORAL
Qty: 90 CAPSULE | Refills: 3 | Status: SHIPPED | OUTPATIENT
Start: 2019-11-17 | End: 2020-12-09 | Stop reason: SDUPTHER

## 2019-11-26 ENCOUNTER — REMOTE DEVICE CLINIC VISIT (OUTPATIENT)
Dept: CARDIOLOGY CLINIC | Facility: CLINIC | Age: 59
End: 2019-11-26
Payer: COMMERCIAL

## 2019-11-26 DIAGNOSIS — Z95.818 PRESENCE OF OTHER CARDIAC IMPLANTS AND GRAFTS: Primary | ICD-10-CM

## 2019-11-26 PROCEDURE — 93298 REM INTERROG DEV EVAL SCRMS: CPT | Performed by: INTERNAL MEDICINE

## 2019-11-26 PROCEDURE — 93299 PR REM INTERROG ICPMS/SCRMS <30 D TECH REVIEW: CPT | Performed by: INTERNAL MEDICINE

## 2019-11-26 NOTE — PROGRESS NOTES
MDT LOOP  CARELINK TRANSMISSION: LOOP RECORDER  PRESENTING RHYTHM NSR @ 70 BPM  BATTERY STATUS "OK"  NO PATIENT OR DEVICE ACTIVATED EPISODES  NORMAL DEVICE FUNCTION   DL

## 2020-02-17 ENCOUNTER — REMOTE DEVICE CLINIC VISIT (OUTPATIENT)
Dept: CARDIOLOGY CLINIC | Facility: CLINIC | Age: 60
End: 2020-02-17
Payer: COMMERCIAL

## 2020-02-17 DIAGNOSIS — Z95.818 PRESENCE OF CARDIAC DEVICE: Primary | ICD-10-CM

## 2020-02-17 PROCEDURE — 93298 REM INTERROG DEV EVAL SCRMS: CPT | Performed by: INTERNAL MEDICINE

## 2020-02-17 PROCEDURE — G2066 INTER DEVC REMOTE 30D: HCPCS | Performed by: INTERNAL MEDICINE

## 2020-02-17 NOTE — PROGRESS NOTES
Results for orders placed or performed in visit on 02/17/20   Cardiac EP device report    Narrative    MDT LOOP  CARELINK TRANSMISSION: BATTERY STATUS "OK"  NO SIGNIFICANT HIGH RATE EPISODES  NORMAL DEVICE FUNCTION   PL

## 2020-05-27 ENCOUNTER — TELEMEDICINE (OUTPATIENT)
Dept: CARDIOLOGY CLINIC | Facility: CLINIC | Age: 60
End: 2020-05-27
Payer: COMMERCIAL

## 2020-05-27 ENCOUNTER — REMOTE DEVICE CLINIC VISIT (OUTPATIENT)
Dept: CARDIOLOGY CLINIC | Facility: CLINIC | Age: 60
End: 2020-05-27
Payer: COMMERCIAL

## 2020-05-27 DIAGNOSIS — Z95.818 PRESENCE OF OTHER CARDIAC IMPLANTS AND GRAFTS: Primary | ICD-10-CM

## 2020-05-27 DIAGNOSIS — I48.0 PAROXYSMAL A-FIB (HCC): Primary | ICD-10-CM

## 2020-05-27 PROCEDURE — RECHECK: Performed by: INTERNAL MEDICINE

## 2020-05-27 PROCEDURE — 99442 PR PHYS/QHP TELEPHONE EVALUATION 11-20 MIN: CPT | Performed by: INTERNAL MEDICINE

## 2020-05-27 RX ORDER — CETIRIZINE HYDROCHLORIDE 10 MG/1
10 TABLET, CHEWABLE ORAL DAILY PRN
COMMUNITY

## 2020-06-30 ENCOUNTER — TELEPHONE (OUTPATIENT)
Dept: CARDIOLOGY CLINIC | Facility: CLINIC | Age: 60
End: 2020-06-30

## 2020-06-30 DIAGNOSIS — Z95.818 PRESENCE OF CARDIAC DEVICE: Primary | ICD-10-CM

## 2020-07-01 ENCOUNTER — APPOINTMENT (OUTPATIENT)
Dept: LAB | Facility: CLINIC | Age: 60
End: 2020-07-01
Payer: COMMERCIAL

## 2020-07-01 ENCOUNTER — TRANSCRIBE ORDERS (OUTPATIENT)
Dept: LAB | Facility: CLINIC | Age: 60
End: 2020-07-01

## 2020-07-01 LAB
ALBUMIN SERPL BCP-MCNC: 3.7 G/DL (ref 3.5–5)
ALP SERPL-CCNC: 44 U/L (ref 46–116)
ALT SERPL W P-5'-P-CCNC: 29 U/L (ref 12–78)
ANION GAP SERPL CALCULATED.3IONS-SCNC: 6 MMOL/L (ref 4–13)
AST SERPL W P-5'-P-CCNC: 30 U/L (ref 5–45)
BASOPHILS # BLD AUTO: 0.01 THOUSANDS/ΜL (ref 0–0.1)
BASOPHILS NFR BLD AUTO: 0 % (ref 0–1)
BILIRUB SERPL-MCNC: 0.6 MG/DL (ref 0.2–1)
BUN SERPL-MCNC: 19 MG/DL (ref 5–25)
CALCIUM SERPL-MCNC: 8.5 MG/DL (ref 8.3–10.1)
CHLORIDE SERPL-SCNC: 102 MMOL/L (ref 100–108)
CO2 SERPL-SCNC: 29 MMOL/L (ref 21–32)
CREAT SERPL-MCNC: 1.23 MG/DL (ref 0.6–1.3)
EOSINOPHIL # BLD AUTO: 0.12 THOUSAND/ΜL (ref 0–0.61)
EOSINOPHIL NFR BLD AUTO: 4 % (ref 0–6)
ERYTHROCYTE [DISTWIDTH] IN BLOOD BY AUTOMATED COUNT: 12.2 % (ref 11.6–15.1)
GFR SERPL CREATININE-BSD FRML MDRD: 63 ML/MIN/1.73SQ M
GLUCOSE P FAST SERPL-MCNC: 100 MG/DL (ref 65–99)
HCT VFR BLD AUTO: 45.8 % (ref 36.5–49.3)
HGB BLD-MCNC: 15.3 G/DL (ref 12–17)
IMM GRANULOCYTES # BLD AUTO: 0.01 THOUSAND/UL (ref 0–0.2)
IMM GRANULOCYTES NFR BLD AUTO: 0 % (ref 0–2)
LYMPHOCYTES # BLD AUTO: 1.07 THOUSANDS/ΜL (ref 0.6–4.47)
LYMPHOCYTES NFR BLD AUTO: 36 % (ref 14–44)
MCH RBC QN AUTO: 31 PG (ref 26.8–34.3)
MCHC RBC AUTO-ENTMCNC: 33.4 G/DL (ref 31.4–37.4)
MCV RBC AUTO: 93 FL (ref 82–98)
MONOCYTES # BLD AUTO: 0.31 THOUSAND/ΜL (ref 0.17–1.22)
MONOCYTES NFR BLD AUTO: 10 % (ref 4–12)
NEUTROPHILS # BLD AUTO: 1.46 THOUSANDS/ΜL (ref 1.85–7.62)
NEUTS SEG NFR BLD AUTO: 50 % (ref 43–75)
NRBC BLD AUTO-RTO: 0 /100 WBCS
PLATELET # BLD AUTO: 181 THOUSANDS/UL (ref 149–390)
PMV BLD AUTO: 9.3 FL (ref 8.9–12.7)
POTASSIUM SERPL-SCNC: 4.1 MMOL/L (ref 3.5–5.3)
PROT SERPL-MCNC: 7 G/DL (ref 6.4–8.2)
RBC # BLD AUTO: 4.93 MILLION/UL (ref 3.88–5.62)
SODIUM SERPL-SCNC: 137 MMOL/L (ref 136–145)
WBC # BLD AUTO: 2.98 THOUSAND/UL (ref 4.31–10.16)

## 2020-07-01 PROCEDURE — 36415 COLL VENOUS BLD VENIPUNCTURE: CPT

## 2020-07-01 PROCEDURE — 85025 COMPLETE CBC W/AUTO DIFF WBC: CPT

## 2020-07-01 PROCEDURE — 80053 COMPREHEN METABOLIC PANEL: CPT

## 2020-07-07 ENCOUNTER — HOSPITAL ENCOUNTER (OUTPATIENT)
Dept: NON INVASIVE DIAGNOSTICS | Facility: HOSPITAL | Age: 60
Discharge: HOME | End: 2020-07-07
Attending: INTERNAL MEDICINE | Admitting: INTERNAL MEDICINE
Payer: COMMERCIAL

## 2020-07-07 VITALS
TEMPERATURE: 98 F | DIASTOLIC BLOOD PRESSURE: 80 MMHG | RESPIRATION RATE: 18 BRPM | OXYGEN SATURATION: 95 % | SYSTOLIC BLOOD PRESSURE: 127 MMHG | HEART RATE: 67 BPM

## 2020-07-07 DIAGNOSIS — Z95.818 PRESENCE OF CARDIAC DEVICE: ICD-10-CM

## 2020-07-07 DIAGNOSIS — Z45.09 ENCOUNTER FOR LOOP RECORDER AT END OF BATTERY LIFE: Primary | ICD-10-CM

## 2020-07-07 PROCEDURE — 33286 RMVL SUBQ CAR RHYTHM MNTR: CPT

## 2020-07-07 PROCEDURE — 33286 RMVL SUBQ CAR RHYTHM MNTR: CPT | Performed by: INTERNAL MEDICINE

## 2020-07-07 RX ORDER — LIDOCAINE HYDROCHLORIDE 10 MG/ML
INJECTION, SOLUTION EPIDURAL; INFILTRATION; INTRACAUDAL; PERINEURAL CODE/TRAUMA/SEDATION MEDICATION
Status: COMPLETED | OUTPATIENT
Start: 2020-07-07 | End: 2020-07-07

## 2020-07-07 RX ADMIN — LIDOCAINE HYDROCHLORIDE 10 ML: 10 INJECTION, SOLUTION EPIDURAL; INFILTRATION; INTRACAUDAL; PERINEURAL at 13:37

## 2020-07-07 NOTE — H&P
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              B ) Medtronic loop recorder in situ, currently at EOS              C ) CHADS2 Vasc = 1, maintained on aspirin  2  Normal LV systolic function per ANITA 6/2017  3  Hypertension  4  Elevated BMI 29    Plan:  1  Loop recorder explantation  He has an Apple watch and will monitor with that moving forward  History of Present Illness   HPI:  Ryan Neumann is a 56y o  year old male with preserved LV systolic function, hypertension, and paroxysmal atrial fibrillation  He typically follows with Dr Seth Cole  He had a Medtronic loop recorder in situ, and was ultimately found to have atrial flutter with 2:1 conduction  While he was largely asymptomatic, there was concern for the development of a tachycardia mediated cardiomyopathy  Thus an ablation was recommended  In June 2017 he underwent cryoablation with pulmonary vein isolation as well as typical flutter line  He has done well in the post ablation setting  He was initially maintained on anticoagulation, however this was ultimately discontinued  He has a CHADS2 Vasc = 1, is currently maintained on aspirin  He has not had recurrent atrial fibrillation over the past several years  Through routine device monitoring it was found that his loop recorder reached end of service, and thus a loop explant was recommended  He presents to undergo that procedure today  No significant changes since he was last seen  He denies chest pain or pressure, shortness of breath, palpitations, dizziness, lightheadedness, or issues with edema      Review of Systems  ROS as noted above, otherwise 12 point review of systems was performed and is negative  Historical Information   Past Medical History:   Diagnosis Date    Hypertension     Irregular heartbeat      Past Surgical History:   Procedure Laterality Date    INGUINAL HERNIA REPAIR Left     TONSILLECTOMY      TOOTH EXTRACTION       Family History:   Family History   Problem Relation Age of Onset    Heart disease Father        Social History   Social History     Substance and Sexual Activity   Alcohol Use Yes    Comment: 6 drinks a week     Social History     Substance and Sexual Activity   Drug Use No     Social History     Tobacco Use   Smoking Status Never Smoker   Smokeless Tobacco Never Used         Meds/Allergies   all medications and allergies reviewed  Home Medications:   (Not in a hospital admission)    No Known Allergies    Objective   Vitals: Blood pressure 127/80, pulse 67, temperature 98 °F (36 7 °C), temperature source Temporal, resp  rate 18, SpO2 95 %  Orthostatic Blood Pressures      Most Recent Value   Blood Pressure  127/80 filed at 07/07/2020 1301          No intake or output data in the 24 hours ending 07/07/20 1308    Invasive Devices     None                 Physical Exam  GEN: NAD, alert and oriented, well appearing  SKIN: dry without significant lesions or rashes  HEENT: NCAT, PERRL, EOMs intact  NECK: No JVD appreciated  CARDIOVASCULAR: RRR, normal S1, S2 without murmurs, rubs, or gallops appreciated  LUNGS: Clear to auscultation bilaterally without wheezes, rhonchi, or rales  ABDOMEN: Soft, nontender, nondistended  EXTREMITIES/VASCULAR: perfused without clubbing, cyanosis, or edema b/l  PSYCH: Normal mood and affect  NEURO: CN ll-Xll grossly intact      Lab Results: I have personally reviewed pertinent lab results      Results from last 7 days   Lab Units 07/01/20  0851   WBC Thousand/uL 2 98*   HEMOGLOBIN g/dL 15 3   HEMATOCRIT % 45 8   PLATELETS Thousands/uL 181     Results from last 7 days Lab Units 20  0851   POTASSIUM mmol/L 4 1   CHLORIDE mmol/L 102   CO2 mmol/L 29   BUN mg/dL 19   CREATININE mg/dL 1 23   CALCIUM mg/dL 8 5                 Imaging: I have personally reviewed pertinent reports  Results for orders placed during the hospital encounter of 16   Echo complete with contrast if indicated    Narrative [address], [address] [facility]  [phone]    Transthoracic Echocardiogram  2D, M-mode, Doppler, and Color Doppler    Study date:  2016    Patient: Barb Blanca  MR number: [id number]  Account number: [de-identified]  : 1960  Age: 60 years  Gender: Male  Status: Outpatient  Location: [facility] [place]  Height: 72 in  Weight: 200 6 lb  BP: 138/ 90 mmHg    Indications: Atrial fibrillation    Diagnoses: I48 0 - Atrial fibrillation    Sonographer:  CORONA Triplett, RDCS  Primary Physician:  Hudson Montana DO  Referring Physician:  Dinh Kaye MD  Group:  \[facility] [place] [facility]\""[facility]  Interpreting Physician:  Juve Real MD    SUMMARY    LEFT VENTRICLE:  Size was normal   Systolic function was normal  Ejection fraction was estimated to be 60 %  There were no regional wall motion abnormalities  Wall thickness was mildly increased  There was mild concentric hypertrophy  LEFT ATRIUM:  The atrium was dilated  MITRAL VALVE:  There was mild to moderate regurgitation  AORTIC VALVE:  The valve was trileaflet  Leaflets exhibited mildly increased thickness  TRICUSPID VALVE:  There was mild regurgitation  HISTORY: PRIOR HISTORY: Hypertension    PROCEDURE: The study was performed in the [facility]  This was a routine study  The transthoracic approach was used  The study  included complete 2D imaging, M-mode, complete spectral Doppler, and color  Doppler  The heart rate was 50 bpm, at the start of the study   Images were  obtained from the parasternal, apical, subcostal, and suprasternal notch  acoustic windows  Image quality was good  LEFT VENTRICLE: Size was normal  Systolic function was normal  Ejection  fraction was estimated to be 60 %  There were no regional wall motion  abnormalities  Wall thickness was mildly increased  There was mild concentric  hypertrophy  RIGHT VENTRICLE: The size was normal  Systolic function was normal  Wall  thickness was normal     LEFT ATRIUM: The atrium was dilated  RIGHT ATRIUM: Size was normal     MITRAL VALVE: There was mild-moderate thickening  DOPPLER: There was mild to  moderate regurgitation  AORTIC VALVE: The valve was trileaflet  Leaflets exhibited mildly increased  thickness  DOPPLER: There was no evidence for stenosis  There was no  regurgitation  TRICUSPID VALVE: The valve structure was normal  There was normal leaflet  separation  DOPPLER: The transtricuspid velocity was within the normal range  There was no evidence for stenosis  There was mild regurgitation  PULMONIC VALVE: Leaflets exhibited normal thickness, no calcification, and  normal cuspal separation  DOPPLER: The transpulmonic velocity was within the  normal range  There was no regurgitation  PERICARDIUM: There was no pericardial effusion  The pericardium was normal in  appearance  AORTA: The root exhibited normal size      SYSTEM MEASUREMENT TABLES    2D  %FS: 43 29 %  AV Diam: 3 44 cm  EDV(Teich): 131 86 ml  EF(Cube): 81 76 %  EF(Teich): 74 06 %  ESV(Cube): 26 25 ml  ESV(Teich): 34 2 ml  IVSd: 1 34 cm  LA Area: 25 83 cm2  LA Diam: 4 07 cm  LVEDV MOD A4C: 96 41 ml  LVEF MOD A4C: 61 45 %  LVESV MOD A4C: 37 16 ml  LVIDd: 5 24 cm  LVIDs: 2 97 cm  LVLd A4C: 8 13 cm  LVLs A4C: 6 94 cm  LVPWd: 1 07 cm  RA Area: 17 94 cm2  RV Diam: 3 78 cm  SI(Cube): 54 99 ml/m2  SI(Teich): 45 63 ml/m2  SV MOD A4C: 59 24 ml  SV(Cube): 117 67 ml  SV(Teich): 97 65 ml    CW  TR MaxP 58 mmHg  TR Vmax: 2 37 m/s    MM  TAPSE: 3 1 cm    PW  E': 0 08 m/s  E/E': 11 06  MV A Valente: 0 69 m/s  MV Dec Barbour: 5 m/s2  MV DecT: 185 03 ms  MV E Valente: 0 93 m/s  MV E/A Ratio: 1 34    [employer] Accredited Echocardiography Laboratory    Prepared and electronically signed by    Jen Ventura MD  Signed [id number] 07:53:42       Code Status: Prior

## 2020-07-07 NOTE — DISCHARGE INSTRUCTIONS
Keep loop recorder incision dry for one week  Do not use lotions/powders/creams on incision  Remove outer bandage 48 hours after procedure - if present, leave underlying steri-strips in place, they will either fall off on their own or will be removed at 2 week follow up appointment  Please call the office if you notice redness, swelling, bleeding, or drainage from incision or if you develop fevers  Please call Mai Walls at [phone] with questions about setting up your Apple Watch

## 2020-07-09 ENCOUNTER — TELEPHONE (OUTPATIENT)
Dept: CARDIOLOGY CLINIC | Facility: CLINIC | Age: 60
End: 2020-07-09

## 2020-07-09 NOTE — TELEPHONE ENCOUNTER
Pt called this morning to advise he had his Loop recorder removed on July 7th  He is asking for a sooner date to have the sutures removed, as last time there was heavier scar tissue and it was difficult to remove the stitches  I spoke with Dr Michael Day  The earliest he should come in is Wednesday, July 15  I rescheduled the pt with the device clinic for Thursday, July 16th at 2 PM   I notified the pt via email  Pt will be sending some of his rhythm strip transmissions to my email for Carlita/Dr Michael Day to review

## 2020-07-16 ENCOUNTER — IN-CLINIC DEVICE VISIT (OUTPATIENT)
Dept: CARDIOLOGY CLINIC | Facility: CLINIC | Age: 60
End: 2020-07-16

## 2020-07-16 DIAGNOSIS — Z48.02 ENCOUNTER FOR REMOVAL OF SUTURES: Primary | ICD-10-CM

## 2020-07-16 PROCEDURE — 99024 POSTOP FOLLOW-UP VISIT: CPT | Performed by: INTERNAL MEDICINE

## 2020-07-16 NOTE — PROGRESS NOTES
Results for orders placed or performed in visit on 07/16/20   Cardiac EP device report    Narrative    MDT LOOP   SITE CHECK EXPLANT: WOUND CHECK: INCISION CLEAN AND DRY WITH EDGES APPROXIMATED; SUTURES REMOVED; WOUND CARE AND RESTRICTIONS REVIEWED WITH PATIENT   NC

## 2020-08-03 ENCOUNTER — TELEPHONE (OUTPATIENT)
Dept: CARDIOLOGY CLINIC | Facility: CLINIC | Age: 60
End: 2020-08-03

## 2020-10-01 ENCOUNTER — TELEPHONE (OUTPATIENT)
Dept: GASTROENTEROLOGY | Facility: CLINIC | Age: 60
End: 2020-10-01

## 2020-10-01 ENCOUNTER — TELEPHONE (OUTPATIENT)
Dept: INTERNAL MEDICINE CLINIC | Facility: CLINIC | Age: 60
End: 2020-10-01

## 2020-10-01 DIAGNOSIS — D36.9 ADENOMATOUS POLYPS: Primary | ICD-10-CM

## 2020-12-09 DIAGNOSIS — I10 ESSENTIAL HYPERTENSION: ICD-10-CM

## 2020-12-09 RX ORDER — AMLODIPINE BESYLATE AND BENAZEPRIL HYDROCHLORIDE 5; 10 MG/1; MG/1
1 CAPSULE ORAL DAILY
Qty: 90 CAPSULE | Refills: 3 | Status: SHIPPED | OUTPATIENT
Start: 2020-12-09 | End: 2021-09-27 | Stop reason: SDUPTHER

## 2020-12-17 ENCOUNTER — TELEMEDICINE (OUTPATIENT)
Dept: GASTROENTEROLOGY | Facility: AMBULARY SURGERY CENTER | Age: 60
End: 2020-12-17
Payer: COMMERCIAL

## 2020-12-17 DIAGNOSIS — D36.9 ADENOMATOUS POLYPS: ICD-10-CM

## 2020-12-17 PROCEDURE — 99203 OFFICE O/P NEW LOW 30 MIN: CPT | Performed by: INTERNAL MEDICINE

## 2020-12-17 RX ORDER — SODIUM, POTASSIUM,MAG SULFATES 17.5-3.13G
1 SOLUTION, RECONSTITUTED, ORAL ORAL ONCE
Qty: 1 BOTTLE | Refills: 0 | Status: SHIPPED | OUTPATIENT
Start: 2020-12-17 | End: 2021-02-18 | Stop reason: ALTCHOICE

## 2020-12-21 ENCOUNTER — TELEPHONE (OUTPATIENT)
Dept: GASTROENTEROLOGY | Facility: AMBULARY SURGERY CENTER | Age: 60
End: 2020-12-21

## 2021-01-01 NOTE — PROGRESS NOTES
"  Discussion/Summary  Normal device function      Results/Data  Cardiac Device Remote 20Feb2017 05:05AM Durward Fuelling     Test Name Result Flag Reference   MISCELLANEOUS COMMENT      NONBILLABLE- CARELINK TRANSMISSION ALERT FOR AF EPISODE LASTING 20 MINS, AVG HR-158 BPM  AF W/ RVR ON ECG  PT ON ASA 81MG  PT OPTED NOT TO TAKE Yessica Puffer  NORMAL DEVICE FUNCTION  GV   Cardiac Electrophysiology Report      [id number]  pdf   DEVICE TYPE Monitor       Cardiac Electrophysiology Report [id number] 05:05AM Durward Fuelling     Test Name Result Flag Reference   Cardiac Electrophysiology Report      [id number]  pdf     Signatures   Electronically signed by : Shahid Garber RN; Feb 20 2017  2:09PM EST                       (Author)    Electronically signed by : NIURKA Hunter ; Feb 20 2017  3:21PM EST                       (Author)    "
(2) well flexed

## 2021-02-08 ENCOUNTER — ANESTHESIA EVENT (OUTPATIENT)
Dept: GASTROENTEROLOGY | Facility: AMBULARY SURGERY CENTER | Age: 61
End: 2021-02-08

## 2021-02-17 NOTE — ANESTHESIA PREPROCEDURE EVALUATION
Procedure:  COLONOSCOPY    Relevant Problems   CARDIO   (+) Hypertension        Physical Exam    Airway    Mallampati score: I  TM Distance: >3 FB  Neck ROM: full     Dental   No notable dental hx     Cardiovascular  Rhythm: regular, Rate: normal, Cardiovascular exam normal    Pulmonary  Pulmonary exam normal Breath sounds clear to auscultation,     Other Findings        Anesthesia Plan  ASA Score- 2     Anesthesia Type- IV sedation with anesthesia with ASA Monitors  Additional Monitors:   Airway Plan:     Comment: Discussed risks/benefits, including medication reactions, awareness, aspiration, and serious/life threatening complications  Plan to maintain native airway with IVGA, monitored with EtCO2  Plan Factors-Exercise tolerance (METS): >4 METS  Patient summary reviewed  Patient instructed to abstain from smoking on day of procedure  Patient did not smoke on day of surgery  Induction- intravenous  Postoperative Plan-     Informed Consent- Anesthetic plan and risks discussed with patient  I personally reviewed this patient with the CRNA  Discussed and agreed on the Anesthesia Plan with the CRNA  Handy Armstrong

## 2021-02-18 ENCOUNTER — ANESTHESIA (OUTPATIENT)
Dept: GASTROENTEROLOGY | Facility: AMBULARY SURGERY CENTER | Age: 61
End: 2021-02-18

## 2021-02-18 ENCOUNTER — HOSPITAL ENCOUNTER (OUTPATIENT)
Dept: GASTROENTEROLOGY | Facility: AMBULARY SURGERY CENTER | Age: 61
Setting detail: OUTPATIENT SURGERY
Discharge: HOME | End: 2021-02-18
Attending: INTERNAL MEDICINE | Admitting: INTERNAL MEDICINE
Payer: COMMERCIAL

## 2021-02-18 VITALS
DIASTOLIC BLOOD PRESSURE: 95 MMHG | RESPIRATION RATE: 20 BRPM | SYSTOLIC BLOOD PRESSURE: 139 MMHG | TEMPERATURE: 96.8 F | WEIGHT: 196 LBS | HEART RATE: 60 BPM | BODY MASS INDEX: 27.44 KG/M2 | HEIGHT: 71 IN | OXYGEN SATURATION: 98 %

## 2021-02-18 VITALS — HEART RATE: 64 BPM

## 2021-02-18 DIAGNOSIS — D36.9 ADENOMATOUS POLYPS: ICD-10-CM

## 2021-02-18 PROCEDURE — 88305 TISSUE EXAM BY PATHOLOGIST: CPT | Performed by: PATHOLOGY

## 2021-02-18 PROCEDURE — 45385 COLONOSCOPY W/LESION REMOVAL: CPT | Performed by: INTERNAL MEDICINE

## 2021-02-18 PROCEDURE — 45380 COLONOSCOPY AND BIOPSY: CPT | Performed by: INTERNAL MEDICINE

## 2021-02-18 RX ORDER — LIDOCAINE HYDROCHLORIDE 10 MG/ML
0.5 INJECTION, SOLUTION EPIDURAL; INFILTRATION; INTRACAUDAL; PERINEURAL ONCE AS NEEDED
Status: DISCONTINUED | OUTPATIENT
Start: 2021-02-18 | End: 2021-02-22 | Stop reason: HOSPADM

## 2021-02-18 RX ORDER — LIDOCAINE HYDROCHLORIDE 10 MG/ML
INJECTION, SOLUTION EPIDURAL; INFILTRATION; INTRACAUDAL; PERINEURAL AS NEEDED
Status: DISCONTINUED | OUTPATIENT
Start: 2021-02-18 | End: 2021-02-18

## 2021-02-18 RX ORDER — PROPOFOL 10 MG/ML
INJECTION, EMULSION INTRAVENOUS CONTINUOUS PRN
Status: DISCONTINUED | OUTPATIENT
Start: 2021-02-18 | End: 2021-02-18

## 2021-02-18 RX ORDER — SODIUM CHLORIDE, SODIUM LACTATE, POTASSIUM CHLORIDE, CALCIUM CHLORIDE 600; 310; 30; 20 MG/100ML; MG/100ML; MG/100ML; MG/100ML
INJECTION, SOLUTION INTRAVENOUS CONTINUOUS PRN
Status: DISCONTINUED | OUTPATIENT
Start: 2021-02-18 | End: 2021-02-18

## 2021-02-18 RX ORDER — SODIUM CHLORIDE, SODIUM LACTATE, POTASSIUM CHLORIDE, CALCIUM CHLORIDE 600; 310; 30; 20 MG/100ML; MG/100ML; MG/100ML; MG/100ML
125 INJECTION, SOLUTION INTRAVENOUS CONTINUOUS
Status: DISCONTINUED | OUTPATIENT
Start: 2021-02-18 | End: 2021-02-22 | Stop reason: HOSPADM

## 2021-02-18 RX ORDER — PROPOFOL 10 MG/ML
INJECTION, EMULSION INTRAVENOUS AS NEEDED
Status: DISCONTINUED | OUTPATIENT
Start: 2021-02-18 | End: 2021-02-18

## 2021-02-18 RX ADMIN — PROPOFOL 120 MCG/KG/MIN: 10 INJECTION, EMULSION INTRAVENOUS at 12:00

## 2021-02-18 RX ADMIN — SODIUM CHLORIDE, SODIUM LACTATE, POTASSIUM CHLORIDE, AND CALCIUM CHLORIDE: .6; .31; .03; .02 INJECTION, SOLUTION INTRAVENOUS at 11:28

## 2021-02-18 RX ADMIN — Medication 40 MG: at 12:23

## 2021-02-18 RX ADMIN — PROPOFOL 120 MG: 10 INJECTION, EMULSION INTRAVENOUS at 11:59

## 2021-02-18 RX ADMIN — LIDOCAINE HYDROCHLORIDE 50 MG: 10 INJECTION, SOLUTION EPIDURAL; INFILTRATION; INTRACAUDAL at 11:59

## 2021-02-18 NOTE — ANESTHESIA POSTPROCEDURE EVALUATION
Post-Op Assessment Note    CV Status:  Stable  Pain Score: 0    Pain management: adequate     Mental Status:  Sleepy   Hydration Status:  Euvolemic and stable   PONV Controlled:  None   Airway Patency:  Patent and adequate      Post Op Vitals Reviewed: Yes      Staff: CRNA         No complications documented      /79 (02/18/21 1230)    Temp (!) 96 8 °F (36 °C) (02/18/21 1230)    Pulse 66 (02/18/21 1230)   Resp 16 (02/18/21 1230)    SpO2 98 % (02/18/21 1230)

## 2021-03-10 DIAGNOSIS — Z23 ENCOUNTER FOR IMMUNIZATION: ICD-10-CM

## 2021-03-16 ENCOUNTER — OFFICE VISIT (OUTPATIENT)
Dept: INTERNAL MEDICINE CLINIC | Facility: CLINIC | Age: 61
End: 2021-03-16
Payer: COMMERCIAL

## 2021-03-16 VITALS
RESPIRATION RATE: 16 BRPM | SYSTOLIC BLOOD PRESSURE: 138 MMHG | HEART RATE: 58 BPM | OXYGEN SATURATION: 98 % | BODY MASS INDEX: 28.9 KG/M2 | DIASTOLIC BLOOD PRESSURE: 84 MMHG | WEIGHT: 206.4 LBS | HEIGHT: 71 IN

## 2021-03-16 DIAGNOSIS — E55.9 VITAMIN D DEFICIENCY: ICD-10-CM

## 2021-03-16 DIAGNOSIS — R73.01 IMPAIRED FASTING BLOOD SUGAR: ICD-10-CM

## 2021-03-16 DIAGNOSIS — Z00.00 HEALTHCARE MAINTENANCE: Primary | ICD-10-CM

## 2021-03-16 DIAGNOSIS — N52.9 ERECTILE DYSFUNCTION, UNSPECIFIED ERECTILE DYSFUNCTION TYPE: ICD-10-CM

## 2021-03-16 DIAGNOSIS — Z12.5 SCREENING PSA (PROSTATE SPECIFIC ANTIGEN): ICD-10-CM

## 2021-03-16 DIAGNOSIS — I10 ESSENTIAL HYPERTENSION: ICD-10-CM

## 2021-03-16 PROCEDURE — 99396 PREV VISIT EST AGE 40-64: CPT | Performed by: NURSE PRACTITIONER

## 2021-03-16 PROCEDURE — 3075F SYST BP GE 130 - 139MM HG: CPT | Performed by: NURSE PRACTITIONER

## 2021-03-16 PROCEDURE — 3725F SCREEN DEPRESSION PERFORMED: CPT | Performed by: NURSE PRACTITIONER

## 2021-03-16 PROCEDURE — 1036F TOBACCO NON-USER: CPT | Performed by: NURSE PRACTITIONER

## 2021-03-16 PROCEDURE — 3079F DIAST BP 80-89 MM HG: CPT | Performed by: NURSE PRACTITIONER

## 2021-03-16 PROCEDURE — 3008F BODY MASS INDEX DOCD: CPT | Performed by: NURSE PRACTITIONER

## 2021-03-16 RX ORDER — SILDENAFIL 50 MG/1
50 TABLET, FILM COATED ORAL AS NEEDED
Qty: 30 TABLET | Refills: 0 | Status: SHIPPED | OUTPATIENT
Start: 2021-03-16 | End: 2021-07-19 | Stop reason: SDUPTHER

## 2021-03-16 NOTE — PROGRESS NOTES
[place]    NAME: Rocío Olivera  AGE: 59 y o  SEX: male  : 1960     DATE: 3/16/2021     Assessment and Plan:     Problem List Items Addressed This Visit        Endocrine    Impaired fasting blood sugar     Per patient hx of elevated a1c  Check bloodwork         Relevant Orders    HEMOGLOBIN A1C W/ EAG ESTIMATION       Cardiovascular and Mediastinum    Hypertension     Controlled  Counseled patient on proper diet that is low in sodium, exercise, and weight loss  No change in medication regimen              Other    Erectile dysfunction     Try generic viagra  Check testosterone level         Relevant Medications    sildenafil (VIAGRA) 50 MG tablet    Other Relevant Orders    Testosterone, free, total      Other Visit Diagnoses     Healthcare maintenance    -  Primary    Relevant Orders    Comprehensive metabolic panel    CBC and differential    Lipid panel    Screening PSA (prostate specific antigen)        Relevant Orders    PSA, total and free    Vitamin D deficiency        Relevant Orders    Vitamin D 25 hydroxy          Immunizations and preventive care screenings were discussed with patient today  Appropriate education was printed on patient's after visit summary  Counseling:  · Exercise: the importance of regular exercise/physical activity was discussed  Recommend exercise 3-5 times per week for at least 30 minutes  Return in about 1 year (around 3/16/2022) for Annual physical      Chief Complaint:     Chief Complaint   Patient presents with    Physical Exam      History of Present Illness:     Adult Annual Physical   Patient here for a comprehensive physical exam  The patient reports problems - erectile dysfunction and hairloss  Diet and Physical Activity  · Diet/Nutrition: well balanced diet  · Exercise: moderate cardiovascular exercise        Depression Screening  PHQ-9 Depression Screening PHQ-9:   Frequency of the following problems over the past two weeks:      Little interest or pleasure in doing things: 0 - not at all  Feeling down, depressed, or hopeless: 0 - not at all  PHQ-2 Score: 0       General Health  · Sleep: sleeps well  · Hearing: normal - bilateral   · Vision: no vision problems  · Dental: regular dental visits   Health  · Symptoms include: none     Review of Systems:     Review of Systems   Constitutional: Negative  HENT: Negative  Eyes: Negative  Respiratory: Negative  Cardiovascular: Negative  Gastrointestinal: Negative  Musculoskeletal: Negative  Neurological: Negative         Past Medical History:     Past Medical History:   Diagnosis Date    Colon polyp     Hypertension     Irregular heartbeat     afib      Past Surgical History:     Past Surgical History:   Procedure Laterality Date    INGUINAL HERNIA REPAIR Left     TONSILLECTOMY      TOOTH EXTRACTION        Family History:     Family History   Problem Relation Age of Onset    Heart disease Father       Social History:        Social History     Socioeconomic History    Marital status: /Civil Union     Spouse name: None    Number of children: 3    Years of education: None    Highest education level: None   Occupational History    None   Social Needs    Financial resource strain: None    Food insecurity     Worry: None     Inability: None    Transportation needs     Medical: None     Non-medical: None   Tobacco Use    Smoking status: Never Smoker    Smokeless tobacco: Never Used   Substance and Sexual Activity    Alcohol use: Yes     Frequency: 2-3 times a week     Drinks per session: 1 or 2    Drug use: No    Sexual activity: Yes     Partners: Female   Lifestyle    Physical activity     Days per week: None     Minutes per session: None    Stress: None   Relationships    Social connections     Talks on phone: None     Gets together: None     Attends Latter day service: None     Active member of club or organization: None     Attends meetings of clubs or organizations: None     Relationship status: None    Intimate partner violence     Fear of current or ex partner: None     Emotionally abused: None     Physically abused: None     Forced sexual activity: None   Other Topics Concern    None   Social History Narrative    None      Current Medications:     Current Outpatient Medications   Medication Sig Dispense Refill    amLODIPine-benazepril (LOTREL 5-10) 5-10 MG per capsule Take 1 capsule by mouth daily 90 capsule 3    cetirizine (ZyrTEC) 10 MG chewable tablet Chew 10 mg daily as needed for allergies      Multiple Vitamins-Minerals (CENTRUM SILVER ADULT 50+) TABS Take 1 tablet by mouth      sildenafil (VIAGRA) 50 MG tablet Take 1 tablet (50 mg total) by mouth as needed for erectile dysfunction 30 tablet 0     No current facility-administered medications for this visit  Allergies:     No Known Allergies   Physical Exam:     /84   Pulse 58   Resp 16   Ht 5' 11" (1 803 m)   Wt 93 6 kg (206 lb 6 4 oz)   SpO2 98%   BMI 28 79 kg/m²     Physical Exam  Vitals signs reviewed  Constitutional:       Appearance: Normal appearance  HENT:      Head: Normocephalic  Right Ear: Tympanic membrane, ear canal and external ear normal       Left Ear: Tympanic membrane, ear canal and external ear normal       Nose: Nose normal       Mouth/Throat:      Mouth: Mucous membranes are moist    Eyes:      Conjunctiva/sclera: Conjunctivae normal       Pupils: Pupils are equal, round, and reactive to light  Neck:      Musculoskeletal: Normal range of motion and neck supple  Cardiovascular:      Rate and Rhythm: Normal rate and regular rhythm  Pulses: Normal pulses  Heart sounds: Normal heart sounds  Pulmonary:      Effort: Pulmonary effort is normal       Breath sounds: Normal breath sounds  Abdominal:      General: Abdomen is flat   Bowel sounds are normal  Palpations: Abdomen is soft  Musculoskeletal: Normal range of motion  Skin:     General: Skin is warm and dry  Neurological:      General: No focal deficit present  Mental Status: He is alert and oriented to person, place, and time     Psychiatric:         Mood and Affect: Mood normal          Behavior: Behavior normal           Oliver Carballo, [address] [place] [address]

## 2021-03-16 NOTE — PATIENT INSTRUCTIONS

## 2021-03-18 ENCOUNTER — IMMUNIZATIONS (OUTPATIENT)
Dept: FAMILY MEDICINE CLINIC | Facility: HOSPITAL | Age: 61
End: 2021-03-18

## 2021-03-18 DIAGNOSIS — Z23 ENCOUNTER FOR IMMUNIZATION: Primary | ICD-10-CM

## 2021-03-18 PROCEDURE — 0011A SARS-COV-2 / COVID-19 MRNA VACCINE (MODERNA) 100 MCG: CPT

## 2021-03-18 PROCEDURE — 91301 SARS-COV-2 / COVID-19 MRNA VACCINE (MODERNA) 100 MCG: CPT

## 2021-03-19 LAB — HBA1C MFR BLD HPLC: 5.3 %

## 2021-04-15 ENCOUNTER — IMMUNIZATIONS (OUTPATIENT)
Dept: FAMILY MEDICINE CLINIC | Facility: HOSPITAL | Age: 61
End: 2021-04-15

## 2021-04-15 DIAGNOSIS — Z23 ENCOUNTER FOR IMMUNIZATION: Primary | ICD-10-CM

## 2021-04-15 PROCEDURE — 91301 SARS-COV-2 / COVID-19 MRNA VACCINE (MODERNA) 100 MCG: CPT

## 2021-04-15 PROCEDURE — 0012A SARS-COV-2 / COVID-19 MRNA VACCINE (MODERNA) 100 MCG: CPT

## 2021-07-19 DIAGNOSIS — N52.9 ERECTILE DYSFUNCTION, UNSPECIFIED ERECTILE DYSFUNCTION TYPE: ICD-10-CM

## 2021-07-19 RX ORDER — SILDENAFIL 50 MG/1
50 TABLET, FILM COATED ORAL AS NEEDED
Qty: 30 TABLET | Refills: 0 | Status: SHIPPED | OUTPATIENT
Start: 2021-07-19 | End: 2021-10-18 | Stop reason: SDUPTHER

## 2021-09-27 DIAGNOSIS — I10 ESSENTIAL HYPERTENSION: ICD-10-CM

## 2021-09-27 RX ORDER — AMLODIPINE BESYLATE AND BENAZEPRIL HYDROCHLORIDE 5; 10 MG/1; MG/1
1 CAPSULE ORAL DAILY
Qty: 90 CAPSULE | Refills: 0 | Status: SHIPPED | OUTPATIENT
Start: 2021-09-27 | End: 2022-03-07

## 2021-10-18 DIAGNOSIS — N52.9 ERECTILE DYSFUNCTION, UNSPECIFIED ERECTILE DYSFUNCTION TYPE: ICD-10-CM

## 2021-10-19 RX ORDER — SILDENAFIL 50 MG/1
50 TABLET, FILM COATED ORAL AS NEEDED
Qty: 30 TABLET | Refills: 0 | Status: SHIPPED | OUTPATIENT
Start: 2021-10-19 | End: 2022-01-25 | Stop reason: SDUPTHER

## 2022-01-25 DIAGNOSIS — N52.9 ERECTILE DYSFUNCTION, UNSPECIFIED ERECTILE DYSFUNCTION TYPE: ICD-10-CM

## 2022-01-25 RX ORDER — SILDENAFIL 50 MG/1
50 TABLET, FILM COATED ORAL AS NEEDED
Qty: 30 TABLET | Refills: 0 | Status: SHIPPED | OUTPATIENT
Start: 2022-01-25 | End: 2022-05-02 | Stop reason: SDUPTHER

## 2022-03-17 ENCOUNTER — OFFICE VISIT (OUTPATIENT)
Dept: INTERNAL MEDICINE CLINIC | Facility: CLINIC | Age: 62
End: 2022-03-17
Payer: COMMERCIAL

## 2022-03-17 VITALS
BODY MASS INDEX: 27.52 KG/M2 | DIASTOLIC BLOOD PRESSURE: 84 MMHG | HEIGHT: 71 IN | HEART RATE: 59 BPM | WEIGHT: 196.6 LBS | OXYGEN SATURATION: 97 % | SYSTOLIC BLOOD PRESSURE: 158 MMHG

## 2022-03-17 DIAGNOSIS — I10 PRIMARY HYPERTENSION: ICD-10-CM

## 2022-03-17 DIAGNOSIS — Z12.5 SCREENING PSA (PROSTATE SPECIFIC ANTIGEN): ICD-10-CM

## 2022-03-17 DIAGNOSIS — R73.01 IMPAIRED FASTING BLOOD SUGAR: ICD-10-CM

## 2022-03-17 DIAGNOSIS — Z00.00 ANNUAL PHYSICAL EXAM: Primary | ICD-10-CM

## 2022-03-17 PROCEDURE — 3008F BODY MASS INDEX DOCD: CPT | Performed by: NURSE PRACTITIONER

## 2022-03-17 PROCEDURE — 99396 PREV VISIT EST AGE 40-64: CPT | Performed by: NURSE PRACTITIONER

## 2022-03-17 PROCEDURE — 3725F SCREEN DEPRESSION PERFORMED: CPT | Performed by: NURSE PRACTITIONER

## 2022-03-17 RX ORDER — ANTIOX #8/OM3/DHA/EPA/LUT/ZEAX 250-2.5 MG
CAPSULE ORAL
COMMUNITY
Start: 2021-07-01

## 2022-03-17 RX ORDER — VITAMIN B COMPLEX
TABLET ORAL
COMMUNITY
Start: 2021-07-01

## 2022-03-17 NOTE — PATIENT INSTRUCTIONS

## 2022-03-17 NOTE — PROGRESS NOTES
[place]    NAME: Akua Arce  AGE: 57 y o  SEX: male  : 1960     DATE: 3/17/2022     Assessment and Plan:     Problem List Items Addressed This Visit        Endocrine    Impaired fasting blood sugar    Relevant Orders    Lipid panel    Comprehensive metabolic panel    CBC and differential    HEMOGLOBIN A1C W/ EAG ESTIMATION       Cardiovascular and Mediastinum    Hypertension     Controlled  Counseled patient on proper diet that is low in sodium, exercise, and weight loss  No change in medication regimen           Relevant Orders    Lipid panel    Comprehensive metabolic panel    CBC and differential    HEMOGLOBIN A1C W/ EAG ESTIMATION      Other Visit Diagnoses     Annual physical exam    -  Primary    Screening PSA (prostate specific antigen)        Relevant Orders    PSA, Total Screen          Immunizations and preventive care screenings were discussed with patient today  Appropriate education was printed on patient's after visit summary  Counseling:  · Exercise: the importance of regular exercise/physical activity was discussed  Recommend exercise 3-5 times per week for at least 30 minutes  Return in about 1 year (around 3/17/2023) for Annual physical      Chief Complaint:     Chief Complaint   Patient presents with    Physical Exam      History of Present Illness:     Adult Annual Physical   Patient here for a comprehensive physical exam  The patient reports no problems  Diet and Physical Activity  · Diet/Nutrition: well balanced diet  · Exercise: walking  Depression Screening  PHQ-2/9 Depression Screening    Little interest or pleasure in doing things: 0 - not at all  Feeling down, depressed, or hopeless: 0 - not at all  PHQ-2 Score: 0  PHQ-2 Interpretation: Negative depression screen       General Health  · Sleep: sleeps well     · Hearing: normal - bilateral   · Vision: no vision problems  · Dental: regular dental visits   Health  · Symptoms include: none     Review of Systems:     Review of Systems   Constitutional: Negative  HENT: Negative  Eyes: Negative  Respiratory: Negative  Cardiovascular: Negative  Gastrointestinal: Negative  Musculoskeletal: Negative  Neurological: Negative  Past Medical History:     Past Medical History:   Diagnosis Date    Colon polyp     Hypertension     Irregular heartbeat     afib      Past Surgical History:     Past Surgical History:   Procedure Laterality Date    INGUINAL HERNIA REPAIR Left     TONSILLECTOMY      TOOTH EXTRACTION        Family History:     Family History   Problem Relation Age of Onset    Parkinsonism Mother     Heart disease Father       Social History:     Social History     Socioeconomic History    Marital status: /Civil Union     Spouse name: None    Number of children: 3    Years of education: None    Highest education level: None   Occupational History    None   Tobacco Use    Smoking status: Never Smoker    Smokeless tobacco: Never Used   Vaping Use    Vaping Use: Never used   Substance and Sexual Activity    Alcohol use:  Yes    Drug use: No    Sexual activity: Yes     Partners: Female   Other Topics Concern    None   Social History Narrative    None     Social Determinants of Health     Financial Resource Strain: Not on file   Food Insecurity: Not on file   Transportation Needs: Not on file   Physical Activity: Not on file   Stress: Not on file   Social Connections: Not on file   Intimate Partner Violence: Not on file   Housing Stability: Not on file      Current Medications:     Current Outpatient Medications   Medication Sig Dispense Refill    amLODIPine-benazepril (LOTREL 5-10) 5-10 MG per capsule TAKE 1 CAPSULE BY MOUTH EVERY DAY 90 capsule 0    cetirizine (ZyrTEC) 10 MG chewable tablet Chew 10 mg daily as needed for allergies      Coenzyme Q10 (CoQ10) 100 MG CAPS  Multiple Vitamins-Minerals (CENTRUM SILVER ADULT 50+) TABS Take 1 tablet by mouth      Multiple Vitamins-Minerals (PreserVision AREDS 2) CAPS       sildenafil (VIAGRA) 50 MG tablet Take 1 tablet (50 mg total) by mouth as needed for erectile dysfunction 30 tablet 0     No current facility-administered medications for this visit  Allergies:     No Known Allergies   Physical Exam:     /84   Pulse 59   Ht 5' 10 63" (1 794 m)   Wt 89 2 kg (196 lb 9 6 oz)   SpO2 97%   BMI 27 71 kg/m²     Physical Exam  Vitals reviewed  Constitutional:       Appearance: Normal appearance  HENT:      Head: Normocephalic  Right Ear: Tympanic membrane, ear canal and external ear normal       Left Ear: Tympanic membrane, ear canal and external ear normal       Nose: Nose normal       Mouth/Throat:      Mouth: Mucous membranes are moist    Eyes:      Conjunctiva/sclera: Conjunctivae normal       Pupils: Pupils are equal, round, and reactive to light  Cardiovascular:      Rate and Rhythm: Normal rate and regular rhythm  Pulses: Normal pulses  Heart sounds: Normal heart sounds  Pulmonary:      Effort: Pulmonary effort is normal       Breath sounds: Normal breath sounds  Abdominal:      General: Abdomen is flat  Bowel sounds are normal       Palpations: Abdomen is soft  Musculoskeletal:         General: Normal range of motion  Cervical back: Normal range of motion and neck supple  Skin:     General: Skin is warm and dry  Neurological:      General: No focal deficit present  Mental Status: He is alert and oriented to person, place, and time  Psychiatric:         Mood and Affect: Mood normal          Behavior: Behavior normal         BMI Counseling: Body mass index is 27 71 kg/m²  The BMI is above normal  Nutrition recommendations include 3-5 servings of fruits/vegetables daily, consuming healthier snacks and increasing intake of lean protein      GENA Cao  [facility]

## 2022-05-02 DIAGNOSIS — N52.9 ERECTILE DYSFUNCTION, UNSPECIFIED ERECTILE DYSFUNCTION TYPE: ICD-10-CM

## 2022-05-02 RX ORDER — SILDENAFIL 50 MG/1
50 TABLET, FILM COATED ORAL AS NEEDED
Qty: 30 TABLET | Refills: 0 | Status: SHIPPED | OUTPATIENT
Start: 2022-05-02 | End: 2022-07-21 | Stop reason: SDUPTHER

## 2022-06-11 DIAGNOSIS — I10 ESSENTIAL HYPERTENSION: ICD-10-CM

## 2022-06-11 RX ORDER — AMLODIPINE BESYLATE AND BENAZEPRIL HYDROCHLORIDE 5; 10 MG/1; MG/1
CAPSULE ORAL
Qty: 90 CAPSULE | Refills: 0 | Status: SHIPPED | OUTPATIENT
Start: 2022-06-11

## 2022-07-21 DIAGNOSIS — N52.9 ERECTILE DYSFUNCTION, UNSPECIFIED ERECTILE DYSFUNCTION TYPE: ICD-10-CM

## 2022-07-21 RX ORDER — SILDENAFIL 50 MG/1
50 TABLET, FILM COATED ORAL AS NEEDED
Qty: 30 TABLET | Refills: 0 | Status: SHIPPED | OUTPATIENT
Start: 2022-07-21

## 2022-09-17 DIAGNOSIS — I10 ESSENTIAL HYPERTENSION: ICD-10-CM

## 2022-09-17 RX ORDER — AMLODIPINE BESYLATE AND BENAZEPRIL HYDROCHLORIDE 5; 10 MG/1; MG/1
CAPSULE ORAL
Qty: 90 CAPSULE | Refills: 0 | Status: SHIPPED | OUTPATIENT
Start: 2022-09-17

## 2022-10-03 ENCOUNTER — VBI (OUTPATIENT)
Dept: ADMINISTRATIVE | Facility: OTHER | Age: 62
End: 2022-10-03

## 2022-11-14 ENCOUNTER — OFFICE VISIT (OUTPATIENT)
Dept: INTERNAL MEDICINE CLINIC | Facility: CLINIC | Age: 62
End: 2022-11-14

## 2022-11-14 VITALS
DIASTOLIC BLOOD PRESSURE: 88 MMHG | HEART RATE: 58 BPM | WEIGHT: 195.6 LBS | BODY MASS INDEX: 27.38 KG/M2 | HEIGHT: 71 IN | SYSTOLIC BLOOD PRESSURE: 146 MMHG | OXYGEN SATURATION: 98 %

## 2022-11-14 DIAGNOSIS — R73.01 IMPAIRED FASTING BLOOD SUGAR: ICD-10-CM

## 2022-11-14 DIAGNOSIS — Z13.21 SCREENING FOR ENDOCRINE, NUTRITIONAL, METABOLIC AND IMMUNITY DISORDER: ICD-10-CM

## 2022-11-14 DIAGNOSIS — Z13.0 SCREENING FOR ENDOCRINE, NUTRITIONAL, METABOLIC AND IMMUNITY DISORDER: ICD-10-CM

## 2022-11-14 DIAGNOSIS — R25.1 TREMOR OF UNKNOWN ORIGIN: Primary | ICD-10-CM

## 2022-11-14 DIAGNOSIS — Z13.29 SCREENING FOR ENDOCRINE, NUTRITIONAL, METABOLIC AND IMMUNITY DISORDER: ICD-10-CM

## 2022-11-14 DIAGNOSIS — Z23 NEEDS FLU SHOT: ICD-10-CM

## 2022-11-14 DIAGNOSIS — Z13.228 SCREENING FOR ENDOCRINE, NUTRITIONAL, METABOLIC AND IMMUNITY DISORDER: ICD-10-CM

## 2022-11-14 DIAGNOSIS — N52.9 ERECTILE DYSFUNCTION, UNSPECIFIED ERECTILE DYSFUNCTION TYPE: ICD-10-CM

## 2022-11-14 DIAGNOSIS — I10 PRIMARY HYPERTENSION: ICD-10-CM

## 2022-11-14 DIAGNOSIS — Z79.899 HIGH RISK MEDICATION USE: ICD-10-CM

## 2022-11-14 RX ORDER — SILDENAFIL 50 MG/1
50 TABLET, FILM COATED ORAL AS NEEDED
Qty: 30 TABLET | Refills: 0 | Status: SHIPPED | OUTPATIENT
Start: 2022-11-14

## 2022-11-14 NOTE — PROGRESS NOTES
Assessment/Plan:    1  Tremor of unknown origin  -     Comprehensive metabolic panel; Future  -     TSH, 3rd generation with Free T4 reflex; Future  -     PTH, intact; Future  -     Vitamin B12; Future  -     Vitamin D 25 hydroxy; Future  -     CBC and differential; Future  -     Magnesium  -     Hemoglobin A1C; Future    2  Screening for endocrine, nutritional, metabolic and immunity disorder  -     Comprehensive metabolic panel; Future  -     TSH, 3rd generation with Free T4 reflex; Future  -     PTH, intact; Future  -     Vitamin B12; Future  -     Vitamin D 25 hydroxy; Future  -     CBC and differential; Future  -     Magnesium  -     Hemoglobin A1C; Future    3  High risk medication use  -     Comprehensive metabolic panel; Future  -     TSH, 3rd generation with Free T4 reflex; Future  -     PTH, intact; Future  -     Vitamin B12; Future  -     Vitamin D 25 hydroxy; Future  -     CBC and differential; Future  -     Magnesium  -     Hemoglobin A1C; Future    4  Impaired fasting blood sugar  -     Hemoglobin A1C; Future    5  Primary hypertension  -     Comprehensive metabolic panel; Future  -     TSH, 3rd generation with Free T4 reflex; Future  -     CBC and differential; Future  -     Magnesium    6  Needs flu shot  -     influenza vaccine, quadrivalent, recombinant, PF, 0 5 mL, for patients 18 yr+ (FLUBLOK)        The case discussed with patient using patient centered shared decision making  The patient was counseled regarding instructions for management,-- risk factor reductions,-- prognosis,-- impressions,-- risks and benefits of treatment options,-- importance of compliance with treatment  I have reviewed the instructions with the patient, answering all questions to his satisfaction      Tremor of unclear cause  Neurologic exam normal  No overt parkinson's s/s on exam  Pt opts to start with blood work up  If unrevealing, will escalate eval->neurology consult  Will call lab results    There are no Patient Instructions on file for this visit  Return in about 4 weeks (around 2022)  Subjective:      Patient ID: Ernestine Davis is a 57 y o  male  Chief Complaint   Patient presents with   • Tremors     Left hand       Kelsi Perdue presents with complain of new onset tremor  Tremor began about 4 months ago  His tremor primarily involves the left hand/arm and occursat rest   Onset of symptoms was gradual, starting about 4 months ago after patient came out of senior living and went back to work  Tremor is currently of mild severity, exacerbated by stress and anxiety however does occur when resting and feeling calm  This tremor does not affect his handwriting and drinking from a cup  Tremor is alleviated by resting extremity and stress management  Symptoms occur intermittently   he also describes symptoms of unilateral hand tremor  He deniesvoice change, drooling during sleep (wet pillows), rigidity, postural changes, difficulty in initiating movement, change in handwriting, difficulty with walking, difficult with posture, difficulty with swallowing and balance problems  His mother had late dx Parkinson's, she  in 80s  Pt's son has an essential tremor  The following portions of the patient's history were reviewed and updated as appropriate: allergies, current medications, past family history, past medical history, past social history, past surgical history and problem list     Review of Systems   Constitutional: Negative for fatigue, fever and unexpected weight change  HENT: Negative for trouble swallowing  Eyes: Negative for visual disturbance  Respiratory: Negative  Cardiovascular: Negative  Gastrointestinal: Negative  Endocrine: Negative  Musculoskeletal: Negative  Neurological: Positive for tremors  Negative for dizziness, seizures, syncope, facial asymmetry, speech difficulty, weakness and headaches  Psychiatric/Behavioral: The patient is nervous/anxious            Current Outpatient Medications   Medication Sig Dispense Refill   • amLODIPine-benazepril (LOTREL 5-10) 5-10 MG per capsule TAKE 1 CAPSULE BY MOUTH EVERY DAY 90 capsule 0   • cetirizine (ZyrTEC) 10 MG chewable tablet Chew 10 mg daily as needed for allergies     • Coenzyme Q10 (CoQ10) 100 MG CAPS      • Multiple Vitamins-Minerals (CENTRUM SILVER ADULT 50+) TABS Take 1 tablet by mouth     • Multiple Vitamins-Minerals (PreserVision AREDS 2) CAPS      • sildenafil (VIAGRA) 50 MG tablet Take 1 tablet (50 mg total) by mouth as needed for erectile dysfunction 30 tablet 0     No current facility-administered medications for this visit  Objective:    /88   Pulse 58   Ht 5' 10 63" (1 794 m)   Wt 88 7 kg (195 lb 9 6 oz)   SpO2 98%   BMI 27 57 kg/m²        Physical Exam  Vitals and nursing note reviewed  Constitutional:       General: He is not in acute distress  Appearance: Normal appearance  He is not ill-appearing  HENT:      Head: Normocephalic and atraumatic  Neck:      Vascular: No carotid bruit  Cardiovascular:      Rate and Rhythm: Normal rate and regular rhythm  Pulses: Normal pulses  Heart sounds: Normal heart sounds  Lymphadenopathy:      Cervical: No cervical adenopathy  Skin:     General: Skin is warm and dry  Coloration: Skin is not pale  Neurological:      General: No focal deficit present  Mental Status: He is alert  Cranial Nerves: No cranial nerve deficit  Sensory: No sensory deficit  Motor: No weakness, tremor or abnormal muscle tone        Coordination: Coordination normal       Gait: Gait normal    Psychiatric:         Mood and Affect: Mood normal          Behavior: Behavior normal                 GENA Gleason

## 2022-11-17 LAB
ALBUMIN SERPL-MCNC: 4.3 G/DL (ref 3.6–5.1)
ALBUMIN/GLOB SERPL: 1.7 (CALC) (ref 1–2.5)
ALP SERPL-CCNC: 42 U/L (ref 35–144)
ALT SERPL-CCNC: 13 U/L (ref 9–46)
AST SERPL-CCNC: 20 U/L (ref 10–35)
BASOPHILS # BLD AUTO: 11 CELLS/UL (ref 0–200)
BASOPHILS NFR BLD AUTO: 0.3 %
BILIRUB SERPL-MCNC: 1 MG/DL (ref 0.2–1.2)
BUN SERPL-MCNC: 22 MG/DL (ref 7–25)
BUN/CREAT SERPL: ABNORMAL (CALC) (ref 6–22)
CALCIUM SERPL-MCNC: 9.1 MG/DL (ref 8.6–10.3)
CALCIUM SERPL-MCNC: 9.1 MG/DL (ref 8.6–10.3)
CHLORIDE SERPL-SCNC: 102 MMOL/L (ref 98–110)
CO2 SERPL-SCNC: 31 MMOL/L (ref 20–32)
CREAT SERPL-MCNC: 1.14 MG/DL (ref 0.7–1.35)
EOSINOPHIL # BLD AUTO: 102 CELLS/UL (ref 15–500)
EOSINOPHIL NFR BLD AUTO: 2.9 %
ERYTHROCYTE [DISTWIDTH] IN BLOOD BY AUTOMATED COUNT: 12.2 % (ref 11–15)
GFR/BSA.PRED SERPLBLD CYS-BASED-ARV: 72 ML/MIN/1.73M2
GLOBULIN SER CALC-MCNC: 2.5 G/DL (CALC) (ref 1.9–3.7)
GLUCOSE SERPL-MCNC: 101 MG/DL (ref 65–99)
HBA1C MFR BLD: 5.3 % OF TOTAL HGB
HCT VFR BLD AUTO: 45.5 % (ref 38.5–50)
HGB BLD-MCNC: 15.2 G/DL (ref 13.2–17.1)
LYMPHOCYTES # BLD AUTO: 861 CELLS/UL (ref 850–3900)
LYMPHOCYTES NFR BLD AUTO: 24.6 %
MAGNESIUM SERPL-MCNC: 2.1 MG/DL (ref 1.5–2.5)
MCH RBC QN AUTO: 30.8 PG (ref 27–33)
MCHC RBC AUTO-ENTMCNC: 33.4 G/DL (ref 32–36)
MCV RBC AUTO: 92.1 FL (ref 80–100)
MONOCYTES # BLD AUTO: 350 CELLS/UL (ref 200–950)
MONOCYTES NFR BLD AUTO: 10 %
NEUTROPHILS # BLD AUTO: 2177 CELLS/UL (ref 1500–7800)
NEUTROPHILS NFR BLD AUTO: 62.2 %
PLATELET # BLD AUTO: 186 THOUSAND/UL (ref 140–400)
PMV BLD REES-ECKER: 10.5 FL (ref 7.5–12.5)
POTASSIUM SERPL-SCNC: 4.1 MMOL/L (ref 3.5–5.3)
PROT SERPL-MCNC: 6.8 G/DL (ref 6.1–8.1)
PTH-INTACT SERPL-MCNC: 53 PG/ML (ref 16–77)
RBC # BLD AUTO: 4.94 MILLION/UL (ref 4.2–5.8)
SODIUM SERPL-SCNC: 139 MMOL/L (ref 135–146)
TSH SERPL-ACNC: 3.02 MIU/L (ref 0.4–4.5)
VIT B12 SERPL-MCNC: 685 PG/ML (ref 200–1100)
WBC # BLD AUTO: 3.5 THOUSAND/UL (ref 3.8–10.8)

## 2022-12-12 ENCOUNTER — VBI (OUTPATIENT)
Dept: ADMINISTRATIVE | Facility: OTHER | Age: 62
End: 2022-12-12

## 2022-12-19 DIAGNOSIS — I10 ESSENTIAL HYPERTENSION: ICD-10-CM

## 2022-12-19 RX ORDER — AMLODIPINE BESYLATE AND BENAZEPRIL HYDROCHLORIDE 5; 10 MG/1; MG/1
CAPSULE ORAL
Qty: 90 CAPSULE | Refills: 0 | Status: SHIPPED | OUTPATIENT
Start: 2022-12-19

## 2023-02-23 DIAGNOSIS — N52.9 ERECTILE DYSFUNCTION, UNSPECIFIED ERECTILE DYSFUNCTION TYPE: ICD-10-CM

## 2023-02-24 DIAGNOSIS — N52.9 ERECTILE DYSFUNCTION, UNSPECIFIED ERECTILE DYSFUNCTION TYPE: ICD-10-CM

## 2023-02-24 RX ORDER — SILDENAFIL 50 MG/1
50 TABLET, FILM COATED ORAL AS NEEDED
Qty: 30 TABLET | Refills: 0 | Status: SHIPPED | OUTPATIENT
Start: 2023-02-24 | End: 2023-02-24 | Stop reason: SDUPTHER

## 2023-02-24 RX ORDER — SILDENAFIL 50 MG/1
50 TABLET, FILM COATED ORAL AS NEEDED
Qty: 30 TABLET | Refills: 0 | Status: SHIPPED | OUTPATIENT
Start: 2023-02-24

## 2023-03-15 DIAGNOSIS — I10 ESSENTIAL HYPERTENSION: ICD-10-CM

## 2023-03-15 RX ORDER — AMLODIPINE BESYLATE AND BENAZEPRIL HYDROCHLORIDE 5; 10 MG/1; MG/1
CAPSULE ORAL
Qty: 90 CAPSULE | Refills: 0 | Status: SHIPPED | OUTPATIENT
Start: 2023-03-15

## 2023-04-05 ENCOUNTER — RA CDI HCC (OUTPATIENT)
Dept: OTHER | Facility: HOSPITAL | Age: 63
End: 2023-04-05

## 2023-04-05 NOTE — PROGRESS NOTES
[facility] 73  coding opportunities       Chart reviewed, no opportunity found: CHART REVIEWED, NO OPPORTUNITY FOUND      This is a reminder to address ALL HCC (risk adjustment) codes as found on active problem list for 2023 as patient scores reset to zero ALIZE      Patients Insurance        Commercial Insurance: [address] [place] [address]

## 2023-06-05 ENCOUNTER — OFFICE VISIT (OUTPATIENT)
Dept: INTERNAL MEDICINE CLINIC | Facility: CLINIC | Age: 63
End: 2023-06-05
Payer: MEDICARE

## 2023-06-05 VITALS
DIASTOLIC BLOOD PRESSURE: 94 MMHG | OXYGEN SATURATION: 97 % | SYSTOLIC BLOOD PRESSURE: 144 MMHG | HEART RATE: 62 BPM | HEIGHT: 71 IN | BODY MASS INDEX: 26.38 KG/M2 | WEIGHT: 188.4 LBS

## 2023-06-05 DIAGNOSIS — Z13.0 SCREENING, ANEMIA, DEFICIENCY, IRON: ICD-10-CM

## 2023-06-05 DIAGNOSIS — Z12.5 SCREENING PSA (PROSTATE SPECIFIC ANTIGEN): ICD-10-CM

## 2023-06-05 DIAGNOSIS — Z23 ENCOUNTER FOR IMMUNIZATION: Primary | ICD-10-CM

## 2023-06-05 DIAGNOSIS — I48.0 PAROXYSMAL ATRIAL FIBRILLATION (HCC): ICD-10-CM

## 2023-06-05 DIAGNOSIS — Z00.00 WELCOME TO MEDICARE PREVENTIVE VISIT: ICD-10-CM

## 2023-06-05 DIAGNOSIS — I10 PRIMARY HYPERTENSION: ICD-10-CM

## 2023-06-05 DIAGNOSIS — Z13.6 SCREENING FOR CARDIOVASCULAR CONDITION: ICD-10-CM

## 2023-06-05 DIAGNOSIS — R25.1 TREMOR: ICD-10-CM

## 2023-06-05 LAB
ALBUMIN SERPL-MCNC: 4.7 G/DL (ref 3.6–5.1)
ALBUMIN/GLOB SERPL: 1.7 (CALC) (ref 1–2.5)
ALP SERPL-CCNC: 46 U/L (ref 35–144)
ALT SERPL-CCNC: 20 U/L (ref 9–46)
AST SERPL-CCNC: 27 U/L (ref 10–35)
BASOPHILS # BLD AUTO: 9 CELLS/UL (ref 0–200)
BASOPHILS NFR BLD AUTO: 0.2 %
BILIRUB SERPL-MCNC: 1 MG/DL (ref 0.2–1.2)
BUN SERPL-MCNC: 17 MG/DL (ref 7–25)
BUN/CREAT SERPL: NORMAL (CALC) (ref 6–22)
CALCIUM SERPL-MCNC: 9.4 MG/DL (ref 8.6–10.3)
CHLORIDE SERPL-SCNC: 101 MMOL/L (ref 98–110)
CHOLEST SERPL-MCNC: 223 MG/DL
CHOLEST/HDLC SERPL: 4.1 (CALC)
CO2 SERPL-SCNC: 30 MMOL/L (ref 20–32)
CREAT SERPL-MCNC: 1.04 MG/DL (ref 0.7–1.35)
EOSINOPHIL # BLD AUTO: 61 CELLS/UL (ref 15–500)
EOSINOPHIL NFR BLD AUTO: 1.3 %
ERYTHROCYTE [DISTWIDTH] IN BLOOD BY AUTOMATED COUNT: 12.6 % (ref 11–15)
GFR/BSA.PRED SERPLBLD CYS-BASED-ARV: 80 ML/MIN/1.73M2
GLOBULIN SER CALC-MCNC: 2.7 G/DL (CALC) (ref 1.9–3.7)
GLUCOSE SERPL-MCNC: 97 MG/DL (ref 65–99)
HCT VFR BLD AUTO: 46.1 % (ref 38.5–50)
HDLC SERPL-MCNC: 55 MG/DL
HGB BLD-MCNC: 15.7 G/DL (ref 13.2–17.1)
LDLC SERPL CALC-MCNC: 145 MG/DL (CALC)
LYMPHOCYTES # BLD AUTO: 813 CELLS/UL (ref 850–3900)
LYMPHOCYTES NFR BLD AUTO: 17.3 %
MCH RBC QN AUTO: 31.3 PG (ref 27–33)
MCHC RBC AUTO-ENTMCNC: 34.1 G/DL (ref 32–36)
MCV RBC AUTO: 91.8 FL (ref 80–100)
MONOCYTES # BLD AUTO: 353 CELLS/UL (ref 200–950)
MONOCYTES NFR BLD AUTO: 7.5 %
NEUTROPHILS # BLD AUTO: 3464 CELLS/UL (ref 1500–7800)
NEUTROPHILS NFR BLD AUTO: 73.7 %
NONHDLC SERPL-MCNC: 168 MG/DL (CALC)
PLATELET # BLD AUTO: 204 THOUSAND/UL (ref 140–400)
PMV BLD REES-ECKER: 10.3 FL (ref 7.5–12.5)
POTASSIUM SERPL-SCNC: 4.3 MMOL/L (ref 3.5–5.3)
PROT SERPL-MCNC: 7.4 G/DL (ref 6.1–8.1)
PSA SERPL-MCNC: 0.61 NG/ML
RBC # BLD AUTO: 5.02 MILLION/UL (ref 4.2–5.8)
SODIUM SERPL-SCNC: 137 MMOL/L (ref 135–146)
TRIGL SERPL-MCNC: 115 MG/DL
WBC # BLD AUTO: 4.7 THOUSAND/UL (ref 3.8–10.8)

## 2023-06-05 PROCEDURE — 99213 OFFICE O/P EST LOW 20 MIN: CPT | Performed by: INTERNAL MEDICINE

## 2023-06-05 PROCEDURE — G0402 INITIAL PREVENTIVE EXAM: HCPCS | Performed by: INTERNAL MEDICINE

## 2023-06-05 NOTE — PATIENT INSTRUCTIONS
Medicare Preventive Visit Patient Instructions  Thank you for completing your Welcome to Medicare Visit or Medicare Annual Wellness Visit today  Your next wellness visit will be due in one year (6/5/2024)  The screening/preventive services that you may require over the next 5-10 years are detailed below  Some tests may not apply to you based off risk factors and/or age  Screening tests ordered at today's visit but not completed yet may show as past due  Also, please note that scanned in results may not display below  Preventive Screenings:  Service Recommendations Previous Testing/Comments   Colorectal Cancer Screening  · Colonoscopy    · Fecal Occult Blood Test (FOBT)/Fecal Immunochemical Test (FIT)  · Fecal DNA/Cologuard Test  · Flexible Sigmoidoscopy Age: 39-70 years old   Colonoscopy: every 10 years (May be performed more frequently if at higher risk)  OR  FOBT/FIT: every 1 year  OR  Cologuard: every 3 years  OR  Sigmoidoscopy: every 5 years  Screening may be recommended earlier than age 39 if at higher risk for colorectal cancer  Also, an individualized decision between you and your healthcare provider will decide whether screening between the ages of 74-80 would be appropriate  Colonoscopy: 02/18/2021  FOBT/FIT: Not on file  Cologuard: Not on file  Sigmoidoscopy: Not on file          Prostate Cancer Screening Individualized decision between patient and health care provider in men between ages of 53-78   Medicare will cover every 12 months beginning on the day after your 50th birthday PSA: 0 6 ng/mL           Hepatitis C Screening Once for adults born between 1945 and 1965  More frequently in patients at high risk for Hepatitis C Hep C Antibody: 09/12/2017        Diabetes Screening 1-2 times per year if you're at risk for diabetes or have pre-diabetes Fasting glucose: 100 mg/dL (7/1/2020)  A1C: 5 3 % of total Hgb (11/16/2022)      Cholesterol Screening Once every 5 years if you don't have a lipid disorder  May order more often based on risk factors  Lipid panel: 03/17/2022         Other Preventive Screenings Covered by Medicare:  1  Abdominal Aortic Aneurysm (AAA) Screening: covered once if your at risk  You're considered to be at risk if you have a family history of AAA or a male between the age of 73-68 who smoking at least 100 cigarettes in your lifetime  2  Lung Cancer Screening: covers low dose CT scan once per year if you meet all of the following conditions: (1) Age 50-69; (2) No signs or symptoms of lung cancer; (3) Current smoker or have quit smoking within the last 15 years; (4) You have a tobacco smoking history of at least 20 pack years (packs per day x number of years you smoked); (5) You get a written order from a healthcare provider  3  Glaucoma Screening: covered annually if you're considered high risk: (1) You have diabetes OR (2) Family history of glaucoma OR (3)  aged 48 and older OR (3)  American aged 72 and older  3  Osteoporosis Screening: covered every 2 years if you meet one of the following conditions: (1) Have a vertebral abnormality; (2) On glucocorticoid therapy for more than 3 months; (3) Have primary hyperparathyroidism; (4) On osteoporosis medications and need to assess response to drug therapy  5  HIV Screening: covered annually if you're between the age of 12-76  Also covered annually if you are younger than 13 and older than 72 with risk factors for HIV infection  For pregnant patients, it is covered up to 3 times per pregnancy      Immunizations:  Immunization Recommendations   Influenza Vaccine Annual influenza vaccination during flu season is recommended for all persons aged >= 6 months who do not have contraindications   Pneumococcal Vaccine   * Pneumococcal conjugate vaccine = PCV13 (Prevnar 13), PCV15 (Vaxneuvance), PCV20 (Prevnar 20)  * Pneumococcal polysaccharide vaccine = PPSV23 (Pneumovax) Adults 25-60 years old: 1-3 doses may be recommended based on certain risk factors  Adults 72 years old: 1-2 doses may be recommended based off what pneumonia vaccine you previously received   Hepatitis B Vaccine 3 dose series if at intermediate or high risk (ex: diabetes, end stage renal disease, liver disease)   Tetanus (Td) Vaccine - COST NOT COVERED BY MEDICARE PART B Following completion of primary series, a booster dose should be given every 10 years to maintain immunity against tetanus  Td may also be given as tetanus wound prophylaxis  Tdap Vaccine - COST NOT COVERED BY MEDICARE PART B Recommended at least once for all adults  For pregnant patients, recommended with each pregnancy  Shingles Vaccine (Shingrix) - COST NOT COVERED BY MEDICARE PART B  2 shot series recommended in those aged 48 and above     Health Maintenance Due:      Topic Date Due   • HIV Screening  Never done   • Colorectal Cancer Screening  02/18/2024   • Hepatitis C Screening  Completed     Immunizations Due:      Topic Date Due   • COVID-19 Vaccine (3 - Moderna series) 06/10/2021   • Pneumococcal Vaccine: 65+ Years (1 - PCV) Never done     Advance Directives   What are advance directives? Advance directives are legal documents that state your wishes and plans for medical care  These plans are made ahead of time in case you lose your ability to make decisions for yourself  Advance directives can apply to any medical decision, such as the treatments you want, and if you want to donate organs  What are the types of advance directives? There are many types of advance directives, and each state has rules about how to use them  You may choose a combination of any of the following:  · Living will: This is a written record of the treatment you want  You can also choose which treatments you do not want, which to limit, and which to stop at a certain time  This includes surgery, medicine, IV fluid, and tube feedings  · Durable power of  for healthcare [facility]):   This is a written record that states who you want to make healthcare choices for you when you are unable to make them for yourself  This person, called a proxy, is usually a family member or a friend  You may choose more than 1 proxy  · Do not resuscitate (DNR) order:  A DNR order is used in case your heart stops beating or you stop breathing  It is a request not to have certain forms of treatment, such as CPR  A DNR order may be included in other types of advance directives  · Medical directive: This covers the care that you want if you are in a coma, near death, or unable to make decisions for yourself  You can list the treatments you want for each condition  Treatment may include pain medicine, surgery, blood transfusions, dialysis, IV or tube feedings, and a ventilator (breathing machine)  · Values history: This document has questions about your views, beliefs, and how you feel and think about life  This information can help others choose the care that you would choose  Why are advance directives important? An advance directive helps you control your care  Although spoken wishes may be used, it is better to have your wishes written down  Spoken wishes can be misunderstood, or not followed  Treatments may be given even if you do not want them  An advance directive may make it easier for your family to make difficult choices about your care  Weight Management   Why it is important to manage your weight:  Being overweight increases your risk of health conditions such as heart disease, high blood pressure, type 2 diabetes, and certain types of cancer  It can also increase your risk for osteoarthritis, sleep apnea, and other respiratory problems  Aim for a slow, steady weight loss  Even a small amount of weight loss can lower your risk of health problems  How to lose weight safely:  A safe and healthy way to lose weight is to eat fewer calories and get regular exercise   You can lose up about 1 pound a week by decreasing the number of calories you eat by 500 calories each day  Healthy meal plan for weight management:  A healthy meal plan includes a variety of foods, contains fewer calories, and helps you stay healthy  A healthy meal plan includes the following:  · Eat whole-grain foods more often  A healthy meal plan should contain fiber  Fiber is the part of grains, fruits, and vegetables that is not broken down by your body  Whole-grain foods are healthy and provide extra fiber in your diet  Some examples of whole-grain foods are whole-wheat breads and pastas, oatmeal, brown rice, and bulgur  · Eat a variety of vegetables every day  Include dark, leafy greens such as spinach, kale, joce greens, and mustard greens  Eat yellow and orange vegetables such as carrots, sweet potatoes, and winter squash  · Eat a variety of fruits every day  Choose fresh or canned fruit (canned in its own juice or light syrup) instead of juice  Fruit juice has very little or no fiber  · Eat low-fat dairy foods  Drink fat-free (skim) milk or 1% milk  Eat fat-free yogurt and low-fat cottage cheese  Try low-fat cheeses such as mozzarella and other reduced-fat cheeses  · Choose meat and other protein foods that are low in fat  Choose beans or other legumes such as split peas or lentils  Choose fish, skinless poultry (chicken or turkey), or lean cuts of red meat (beef or pork)  Before you cook meat or poultry, cut off any visible fat  · Use less fat and oil  Try baking foods instead of frying them  Add less fat, such as margarine, sour cream, regular salad dressing and mayonnaise to foods  Eat fewer high-fat foods  Some examples of high-fat foods include french fries, doughnuts, ice cream, and cakes  · Eat fewer sweets  Limit foods and drinks that are high in sugar  This includes candy, cookies, regular soda, and sweetened drinks  Exercise:  Exercise at least 30 minutes per day on most days of the week   Some examples of exercise include walking, biking, dancing, and swimming  You can also fit in more physical activity by taking the stairs instead of the elevator or parking farther away from stores  Ask your healthcare provider about the best exercise plan for you  © Copyright [employer] 2018 Information is for End User's use only and may not be sold, redistributed or otherwise used for commercial purposes   All illustrations and images included in CareNotes® are the copyrighted property of [employer]  or [address] [place], [place] [address]pe

## 2023-06-05 NOTE — PROGRESS NOTES
Assessment and Plan:     Problem List Items Addressed This Visit        Cardiovascular and Mediastinum    Hypertension     Mild elevation patient is anxious today continue monitor continue Lotrel         Paroxysmal atrial fibrillation (HCC)     Clinically stable and doing well continue the current medical regiment will continue monitor  Other    Tremor     Resting tremor bradykinesia and cogwheeling left arm I am concerned the patient may have early Parkinson's I would like the patient to be evaluated by neurology         Relevant Orders    Ambulatory Referral to Neurology    Welcome to Medicare preventive visit     Assessment and plan 1  Medicare subsequent annual wellness examination overall the patient is clinically stable and doing well, we encouraged the patient to follow a healthy and balanced diet  We recommend that the patient exercise routinely approximately 30 minutes 5 times per week   We have reviewed the patient's vaccines and have made recommendations for updates if necessary annual flu vaccine in the fall       We will be ordering screening laboratories which are age appropriate  Return to the office in 3 months    call if any problems  Other Visit Diagnoses     Encounter for immunization    -  Primary    Screening for cardiovascular condition        Relevant Orders    Comprehensive metabolic panel    Lipid Panel with Direct LDL reflex    Screening PSA (prostate specific antigen)        Relevant Orders    PSA, Total Screen    Screening, anemia, deficiency, iron        Relevant Orders    CBC (Includes Diff/Plt) (Refl)      Patient would like to hold off on the pneumonia shot  BMI Counseling: Body mass index is 26 61 kg/m²  The BMI is above normal  Nutrition recommendations include decreasing portion sizes and moderation in carbohydrate intake  Exercise recommendations include exercising 3-5 times per week   Rationale for BMI follow-up plan is due to patient being overweight or obese  Depression Screening and Follow-up Plan: Patient was screened for depression during today's encounter  They screened negative with a PHQ-2 score of 0  Preventive health issues were discussed with patient, and age appropriate screening tests were ordered as noted in patient's After Visit Summary  Personalized health advice and appropriate referrals for health education or preventive services given if needed, as noted in patient's After Visit Summary  History of Present Illness:     Patient presents for a Medicare Wellness Visit    HPI 76-year old male coming in for a follow up visit regarding tremor, paroxysmal atrial fibrillation, hypertension and overweight; the patient reports me compliant taking medications without untoward side effects the  The patient is here to review his medical condition, update me on the medical condition and the patient reports me no hospitalizations and no ER visits  He is trying to follow a healthy and balanced diet he remains active here to review laboratories in detail the pt reports the left hand and arm shaking  Some weakness of  Left hand   Able to play golf ,  Over the last 6 month,  No memory ,  Difficulty getting the head of the , mom  dementia , pd;  80 yrs old,  Son with et ,  Worse with stress,  Retired ,   Patient Care Team:  Jasmyn Mota DO as PCP - MD Keily Acosta MD     Review of Systems:     Review of Systems   Constitutional: Negative for activity change, appetite change and unexpected weight change  HENT: Negative for congestion  Eyes: Negative for visual disturbance  Respiratory: Negative for cough and shortness of breath  Cardiovascular: Negative for chest pain  Gastrointestinal: Negative for abdominal pain, diarrhea, nausea and vomiting  Neurological: Positive for tremors  Negative for dizziness, light-headedness and headaches  Hematological: Negative for adenopathy  Problem List:     Patient Active Problem List   Diagnosis   • Wellness examination   • Encounter for loop recorder at end of battery life   • Hypertension   • Erectile dysfunction   • Impaired fasting blood sugar   • Paroxysmal atrial fibrillation (HCC)   • Tremor   • Welcome to Medicare preventive visit      Past Medical and Surgical History:     Past Medical History:   Diagnosis Date   • Colon polyp    • Hypertension    • Irregular heartbeat     afib     Past Surgical History:   Procedure Laterality Date   • INGUINAL HERNIA REPAIR Left    • TONSILLECTOMY     • TOOTH EXTRACTION        Family History:     Family History   Problem Relation Age of Onset   • Parkinsonism Mother    • Heart disease Father       Social History:     Social History     Socioeconomic History   • Marital status: /Civil Union     Spouse name: None   • Number of children: 3   • Years of education: None   • Highest education level: None   Occupational History   • None   Tobacco Use   • Smoking status: Never   • Smokeless tobacco: Never   Vaping Use   • Vaping Use: Never used   Substance and Sexual Activity   • Alcohol use: Yes     Alcohol/week: 4 0 standard drinks of alcohol     Types: 4 Glasses of wine per week   • Drug use: No   • Sexual activity: Yes     Partners: Female     Birth control/protection: Male Sterilization   Other Topics Concern   • None   Social History Narrative   • None     Social Determinants of Health     Financial Resource Strain: Low Risk  (6/5/2023)    Overall Financial Resource Strain (CARDIA)    • Difficulty of Paying Living Expenses: Not hard at all   Food Insecurity: Not on file   Transportation Needs: No Transportation Needs (6/5/2023)    PRAPARE - Transportation    • Lack of Transportation (Medical): No    • Lack of Transportation (Non-Medical):  No   Physical Activity: Not on file   Stress: Not on file   Social Connections: Not on file   Intimate Partner Violence: Not on file   Housing Stability: Not on file      Medications and Allergies:     Current Outpatient Medications   Medication Sig Dispense Refill   • amLODIPine-benazepril (LOTREL 5-10) 5-10 MG per capsule TAKE 1 CAPSULE BY MOUTH EVERY DAY 90 capsule 0   • Coenzyme Q10 (CoQ10) 100 MG CAPS      • Multiple Vitamins-Minerals (CENTRUM SILVER ADULT 50+) TABS Take 1 tablet by mouth     • Multiple Vitamins-Minerals (PreserVision AREDS 2) CAPS  (Patient not taking: Reported on 6/5/2023)     • sildenafil (VIAGRA) 50 MG tablet Take 1 tablet (50 mg total) by mouth as needed for erectile dysfunction 30 tablet 3     No current facility-administered medications for this visit  No Known Allergies   Immunizations:     Immunization History   Administered Date(s) Administered   • COVID-19 MODERNA VACC 0 5 ML IM 03/18/2021, 04/15/2021   • Hep A, adult 01/18/2019   • Influenza Quadrivalent Preservative Free 3 years and older IM 09/15/2017   • Influenza, injectable, quadrivalent, preservative free 0 5 mL 12/01/2020   • Influenza, recombinant, quadrivalent,injectable, preservative free 10/05/2018, 11/14/2022   • Tdap 09/09/2019   • Typhoid Live, oral 01/18/2019   • Zoster Vaccine Recombinant 08/30/2019, 11/01/2019      Health Maintenance:         Topic Date Due   • HIV Screening  Never done   • Colorectal Cancer Screening  02/18/2024   • Hepatitis C Screening  Completed         Topic Date Due   • COVID-19 Vaccine (3 - Moderna series) 06/10/2021   • Pneumococcal Vaccine: 65+ Years (1 - PCV) Never done      Medicare Screening Tests and Risk Assessments:     Navid Hubbard is here for his Welcome to Medicare visit  Health Risk Assessment:   Patient rates overall health as excellent  Patient feels that their physical health rating is same  Patient is very satisfied with their life  Eyesight was rated as same  Hearing was rated as same  Patient feels that their emotional and mental health rating is slightly better  Patients states they are never, rarely angry   Patient states they are never, rarely unusually tired/fatigued  Pain experienced in the last 7 days has been none  Patient states that he has experienced no weight loss or gain in last 6 months  Depression Screening:   PHQ-2 Score: 0      Fall Risk Screening: In the past year, patient has experienced: no history of falling in past year      Home Safety:  Patient does not have trouble with stairs inside or outside of their home  Patient has working smoke alarms and has working carbon monoxide detector  Nutrition:   Current diet is Regular  Medications:   Patient is currently taking over-the-counter supplements  OTC medications include: see medication list  Patient is able to manage medications  Activities of Daily Living (ADLs)/Instrumental Activities of Daily Living (IADLs):   Walk and transfer into and out of bed and chair?: Yes  Dress and groom yourself?: Yes    Bathe or shower yourself?: Yes    Feed yourself? Yes  Do your laundry/housekeeping?: Yes  Manage your money, pay your bills and track your expenses?: Yes  Make your own meals?: Yes    Do your own shopping?: Yes    Previous Hospitalizations:   Any hospitalizations or ED visits within the last 12 months?: No      Advance Care Planning:   Living will: Yes    Durable POA for healthcare:  Yes    Advanced directive: Yes      Cognitive Screening:   Provider or family/friend/caregiver concerned regarding cognition?: No    PREVENTIVE SCREENINGS      Cardiovascular Screening:    General: Screening Current      Diabetes Screening:     General: Screening Current      Colorectal Cancer Screening:     General: Screening Current      Abdominal Aortic Aneurysm (AAA) Screening:    Risk factors include: age between 73-69 yo        Lung Cancer Screening:     General: Screening Not Indicated      Hepatitis C Screening:    General: Screening Current    Screening, Brief Intervention, and Referral to Treatment (SBIRT)    Screening  Typical number of drinks in a day: 0  Typical "number of drinks in a week: 4  Interpretation: Low risk drinking behavior  Single Item Drug Screening:  How often have you used an illegal drug (including marijuana) or a prescription medication for non-medical reasons in the past year? never    Single Item Drug Screen Score: 0  Interpretation: Negative screen for possible drug use disorder    No results found  Physical Exam:     /94   Pulse 62   Ht 5' 10 55\" (1 792 m)   Wt 85 5 kg (188 lb 6 4 oz)   SpO2 97%   BMI 26 61 kg/m²     Physical Exam  Vitals and nursing note reviewed  Constitutional:       General: He is not in acute distress  Appearance: Normal appearance  He is well-developed  He is not ill-appearing, toxic-appearing or diaphoretic  HENT:      Head: Normocephalic and atraumatic  Right Ear: External ear normal       Left Ear: External ear normal       Nose: Nose normal    Eyes:      Pupils: Pupils are equal, round, and reactive to light  Cardiovascular:      Rate and Rhythm: Normal rate and regular rhythm  Heart sounds: Normal heart sounds  No murmur heard  Pulmonary:      Effort: Pulmonary effort is normal       Breath sounds: Normal breath sounds  Abdominal:      General: There is no distension  Palpations: Abdomen is soft  Tenderness: There is no abdominal tenderness  There is no guarding  Neurological:      Mental Status: He is alert       Pill-rolling tremor, bradykinesia gait stable mild cogwheeling    Katlyn Clunes, DO  "

## 2023-06-06 ENCOUNTER — TELEPHONE (OUTPATIENT)
Dept: INTERNAL MEDICINE CLINIC | Facility: CLINIC | Age: 63
End: 2023-06-06

## 2023-06-06 DIAGNOSIS — R89.9 ABNORMAL LABORATORY TEST: Primary | ICD-10-CM

## 2023-06-06 DIAGNOSIS — R25.1 TREMOR: Primary | ICD-10-CM

## 2023-06-06 DIAGNOSIS — N52.9 ERECTILE DYSFUNCTION, UNSPECIFIED ERECTILE DYSFUNCTION TYPE: ICD-10-CM

## 2023-06-06 RX ORDER — SILDENAFIL 50 MG/1
50 TABLET, FILM COATED ORAL AS NEEDED
Qty: 30 TABLET | Refills: 3 | Status: SHIPPED | OUTPATIENT
Start: 2023-06-06

## 2023-06-06 NOTE — TELEPHONE ENCOUNTER
I called and spoke with the patient he asked that I send him a my chart message with the information

## 2023-06-06 NOTE — TELEPHONE ENCOUNTER
Patient called the neurologist to schedule an appointment  He was informed that Dr Suhas Ferris is booking out for a year and a half          Please advise

## 2023-06-08 PROBLEM — R25.1 TREMOR: Status: ACTIVE | Noted: 2023-06-08

## 2023-06-08 PROBLEM — Z00.00 WELCOME TO MEDICARE PREVENTIVE VISIT: Status: ACTIVE | Noted: 2023-06-08

## 2023-06-08 NOTE — ASSESSMENT & PLAN NOTE
Resting tremor bradykinesia and cogwheeling left arm I am concerned the patient may have early Parkinson's I would like the patient to be evaluated by neurology

## 2023-06-08 NOTE — ASSESSMENT & PLAN NOTE
Assessment and plan 1  Health maintenance annual wellness examination overall the patient is clinically stable and doing well, we encouraged the patient to follow a healthy and balanced diet  We recommend that the patient exercise routinely approximately 30 minutes 5 times per week   We have reviewed the patient's vaccines and have made recommendations for updates if necessary annual flu shot in the fall       We will be ordering screening laboratories which are age appropriate  Return to the office in 3 months for    call if any problems

## 2023-06-08 NOTE — ASSESSMENT & PLAN NOTE
Assessment and plan 1  Medicare subsequent annual wellness examination overall the patient is clinically stable and doing well, we encouraged the patient to follow a healthy and balanced diet  We recommend that the patient exercise routinely approximately 30 minutes 5 times per week   We have reviewed the patient's vaccines and have made recommendations for updates if necessary annual flu vaccine in the fall       We will be ordering screening laboratories which are age appropriate  Return to the office in 3 months    call if any problems

## 2023-06-11 DIAGNOSIS — I10 ESSENTIAL HYPERTENSION: ICD-10-CM

## 2023-06-11 RX ORDER — AMLODIPINE BESYLATE AND BENAZEPRIL HYDROCHLORIDE 5; 10 MG/1; MG/1
CAPSULE ORAL
Qty: 90 CAPSULE | Refills: 0 | Status: SHIPPED | OUTPATIENT
Start: 2023-06-11

## 2023-08-07 PROBLEM — Z00.00 WELCOME TO MEDICARE PREVENTIVE VISIT: Status: RESOLVED | Noted: 2023-06-08 | Resolved: 2023-08-07

## 2023-08-21 DIAGNOSIS — N52.9 ERECTILE DYSFUNCTION, UNSPECIFIED ERECTILE DYSFUNCTION TYPE: ICD-10-CM

## 2023-08-21 RX ORDER — SILDENAFIL 50 MG/1
50 TABLET, FILM COATED ORAL AS NEEDED
Qty: 30 TABLET | Refills: 3 | Status: SHIPPED | OUTPATIENT
Start: 2023-08-21

## 2023-09-18 ENCOUNTER — NURSE TRIAGE (OUTPATIENT)
Dept: INTERNAL MEDICINE CLINIC | Facility: CLINIC | Age: 63
End: 2023-09-18

## 2023-09-18 DIAGNOSIS — I10 ESSENTIAL HYPERTENSION: ICD-10-CM

## 2023-09-18 RX ORDER — AMLODIPINE BESYLATE AND BENAZEPRIL HYDROCHLORIDE 5; 10 MG/1; MG/1
CAPSULE ORAL
Qty: 90 CAPSULE | Refills: 0 | Status: SHIPPED | OUTPATIENT
Start: 2023-09-18

## 2023-09-18 NOTE — TELEPHONE ENCOUNTER
----- Message from Nadia Rankin MA sent at 9/18/2023 11:02 AM EDT -----  Pt needs help getting into Dr. Adriana Martinez, was told it would be about a year. Would like to get help getting in sooner.  Pls call pt back

## 2023-09-20 ENCOUNTER — TELEPHONE (OUTPATIENT)
Dept: NEUROLOGY | Facility: CLINIC | Age: 63
End: 2023-09-20

## 2023-09-20 NOTE — TELEPHONE ENCOUNTER
Called pt and LMOM stating that I am calling in regards to scheduling an appt with Dr. Muhammad for a consult visit. I then asked the patient to please give us a call back to get this appt scheduled for him.

## 2023-10-12 ENCOUNTER — CONSULT (OUTPATIENT)
Dept: NEUROLOGY | Facility: CLINIC | Age: 63
End: 2023-10-12
Payer: COMMERCIAL

## 2023-10-12 VITALS
BODY MASS INDEX: 26.27 KG/M2 | HEART RATE: 57 BPM | WEIGHT: 186 LBS | SYSTOLIC BLOOD PRESSURE: 138 MMHG | DIASTOLIC BLOOD PRESSURE: 80 MMHG

## 2023-10-12 DIAGNOSIS — G20.A1 PARKINSON DISEASE: ICD-10-CM

## 2023-10-12 PROCEDURE — 99205 OFFICE O/P NEW HI 60 MIN: CPT | Performed by: PSYCHIATRY & NEUROLOGY

## 2023-10-12 NOTE — PATIENT INSTRUCTIONS
Asymmetric bradykinesia, rest tremor and rigidity consistent with parkinsonism, most likely PD. Will obtain MRI brain to rule out other underlying cause. Options for early PD include :  -levodopa, a drug that is converted into dopamine in the brain  -dopamine agonists (ropinirole, pramipexole, Neupro patch) drugs that mimic the effects of dopamine  -monoamine oxidase B (MAO-B) inhibitors (rasagiline, selegiline, Xadago), drugs that prevent an enzyme called MAO-B from breaking down dopamine. According to recent AAN guideline it sis reported that levodopa provides superior benefit at reducing motor symptoms when compared to treatment with either dopamine agonists or MAO-B inhibitors. However-  -Initial treatment with levodopa is more likely to cause dyskinesia--involuntary movements during the first five years of treatment so it is recommended to try to remain on lowest effective dose. -Dopamine agonist are more likely to cause impulse-control disorders such as compulsive gambling, eating, shopping or sexual activity, as well as hallucinations and excessive daytime sedation. -MAO-B inhibitors take longer to see the effect and in the studies patients were more likely to require additional therapy within two to three years. I do like the concept of MAO-B in patient with minimal symptoms as it is not adding additional dopamine but utilizing your own dopamine.

## 2023-10-12 NOTE — PROGRESS NOTES
Patient ID: Kevin Perkins is a 70 y.o. male. Assessment/Plan:    Parkinson disease  Asymmetric bradykinesia, rest tremor and rigidity consistent with parkinsonism, most likely PD given slow progression. H&Y Stage 1 with nondominant left-sided symptoms. Overall has been functioning well off medications. He has increased his overall exercise and has been keeping a healthy diet and taking supplements. Spent discussing the clinical diagnosis of Parkinson's disease. Slow progression. We discussed potential additional studies that can be done which would support a diagnosis of parkinsonism such as alpha-synuclein skin biopsy versus DaTscan. At this time this would not . We discussed potential benefits of obtaining a MRI to rule out underlying structural vascular pathology given unilateral and very slow progression of symptoms. Will obtain MRI brain to rule out other underlying cause. We reviewed treatment options for early PD include :  -levodopa,  -dopamine agonists (ropinirole, pramipexole, Neupro patch)   -monoamine oxidase B (MAO-B) inhibitors (rasagiline, selegiline, Lucille Pitch)    According to recent AAN guideline it sis reported that levodopa provides superior benefit at reducing motor symptoms when compared to treatment with either dopamine agonists or MAO-B inhibitors. However-  -Initial treatment with levodopa is more likely to cause dyskinesia--involuntary movements during the first five years of treatment so it is recommended to try to remain on lowest effective dose. -Dopamine agonist are more likely to cause impulse-control disorders such as compulsive gambling, eating, shopping or sexual activity, as well as hallucinations and excessive daytime sedation. -MAO-B inhibitors take longer to see the effect and in the studies patients were more likely to require additional therapy within two to three years.  I    He will contact the office should he wish to start medication prior to next visit. Given his mild symptoms I would consider starting a monoamine oxidase inhibitor. Diagnoses and all orders for this visit:    Parkinson disease  -     Ambulatory Referral to Neurology  -     MRI brain without contrast; Future    Other orders  -     cyanocobalamin (VITAMIN B-12) 100 MCG tablet; Take 100 mcg by mouth daily  -     vitamin E 100 UNIT capsule; Take 100 Units by mouth daily       I have spent a total time of 62 minutes on 10/12/23 in caring for this patient including Prognosis, Risks and benefits of tx options, Risk factor reductions, Impressions, Counseling / Coordination of care, Documenting in the medical record, and Obtaining or reviewing history  . Subjective:    Nelda Escobedo is a 70year old  man with Parkinson disease who presents for movement disorder evaluation to establish care. Symptom onset around  with left leg pain. Overtime he started to noted changes in fine motor controlled of the left hand. Activities were taking longer to completed. He developed left hand tremors. 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 He was diagnosed by Dr. Juan Reyes and opted to remain off medications. He has been taking vitamin supplements including B12 and B1. Started taking nutritional supplements as breakfast.  Three days ago he started taking flow state Promix supplement which contains mucina puriens in additional to other vitamins taken 20 minutes prior to activity up to two times daily. No change notes thus far. Mother with PD dx in 80's and lived until 80's with dementia. Father  of CHF but was noted to have a tremor and cognitive changes in his 63's. Tremor- mild and intermittent. Not bothersome. He tends to hold hand in pinching posture. He has noted left hand heat up and sweats during exercise.    Speech- unchanged   Saliva and drooling- occasionally while drooling   Chewing and swallowing-none  Dressing and hygiene performed without issues. Handwriting is unchanged. Arising out of chair without difficulty   Walking and balance- no changes  Freezing- no    Sleep can be interrupted by shoulder pain if leaning on it. RBD-Talks and acts out dreams for 2-3 years. [facility] out of bed in sleep a month ago. Urinary problems- none   Erectile changes   Lightheadedness on standing - no symptoms     Cognitive impairment - none      Stays active running on treadmill. He has noted a change in ingrid and stamina. Also does functional exercises. Moving to [place] June 2024. He is scheduledto meet a neurologist there in Jan to establish care so he is set when he moves. Objective:    Blood pressure 138/80, pulse 57, weight 84.4 kg (186 lb). Physical Exam  Vitals reviewed. Eyes:      Extraocular Movements: Extraocular movements intact. Pupils: Pupils are equal, round, and reactive to light. Neurological:      Mental Status: He is alert. Motor: Motor strength is normal.     Deep Tendon Reflexes:      Reflex Scores:       Bicep reflexes are 1+ on the right side and 1+ on the left side. Brachioradialis reflexes are 2+ on the right side and 2+ on the left side. Patellar reflexes are 2+ on the right side and 2+ on the left side. Neurological Exam  Mental Status  Alert. Oriented to person, place, time and situation. Speech: hypophonia. Language is fluent with no aphasia. Attention and concentration are normal.    Cranial Nerves  CN III, IV, VI: Extraocular movements intact bilaterally. Pupils equal round and reactive to light bilaterally. CN VII: Full and symmetric facial movement. CN VIII: Hearing is normal.  CN IX, X: Palate elevates symmetrically  CN XI: Shoulder shrug strength is normal.  CN XII: Tongue midline without atrophy or fasciculations. Motor  Normal muscle bulk throughout. Normal muscle tone. Strength is 5/5 throughout all four extremities.     Sensory  Light touch is normal in upper and lower extremities. Reflexes                                            Right                      Left  Brachioradialis                    2+                         2+  Biceps                                 1+                         1+  Patellar                                2+                         2+    Coordination  Right: Finger-to-nose normal. Rapid alternating movement abnormality:Left: Finger-to-nose normal. Rapid alternating movement abnormality:  See motor UPDRS. Gait  Casual gait: Normal pull test. Able to rise from chair without using arms. Moderate stooped. Reduced left stride and arm swing. .         MDS UPDRS III                                       Time since last dose:    Speech  0   Facial Expression  1   Rigidity - Neck  1   Rigidity - Upper Extremity (R)  0   Rigidity - Upper Extremity (L)   1   Rigidity - Lower Extremity (R)  0   Rigidity - Lower Extremity (L)   0   Finger Taps (R)   0   Finger Taps (L)   1   Hand Movement (R)  0   Hand Movement (L)   0   Pronation/Supination (R)  0   Pronation/Supination (L)   2   Toe Tapping (R) 0   Toe Tapping (L) 0   Leg Agility (R)  0   Leg Agility (L)   0   Arising from Chair   0   Gait   1   Freezing of Gait 0   Postural Stability   0   Posture 1   Global spontaneity of movement 1   Postural Tremor (Ri 0   Postural Tremor (L) 1   Kinetic Tremor (R)  0   Kinetic Tremor (L)  1   Rest tremor amplitude RUE 0   Rest tremor amplitude LUE 2   Rest tremor amplitude RLE 0   Reset tremor amplitude LLE 0   Lip/Jaw Tremor  0   Consistency of tremor 2   Motor Exam Total:           ROS:    Review of Systems   Constitutional:  Negative for appetite change, fatigue and fever. HENT: Negative. Negative for hearing loss, tinnitus, trouble swallowing and voice change. Eyes: Negative. Negative for photophobia, pain and visual disturbance. Respiratory: Negative. Negative for shortness of breath. Cardiovascular: Negative. Negative for palpitations. Gastrointestinal: Negative. Negative for nausea and vomiting. Endocrine: Negative. Negative for cold intolerance. Genitourinary: Negative. Negative for dysuria, frequency and urgency. Musculoskeletal:  Positive for myalgias (Right Hip and in shoulders). Negative for back pain, gait problem and neck pain. Skin: Negative. Negative for rash. Allergic/Immunologic: Negative. Neurological:  Positive for tremors (Left Arm), weakness (Left Hand) and numbness (Both shoulders). Negative for dizziness, seizures, syncope, facial asymmetry, speech difficulty, light-headedness and headaches. Hematological: Negative. Does not bruise/bleed easily. Psychiatric/Behavioral: Negative. Negative for confusion, hallucinations and sleep disturbance. All other systems reviewed and are negative.

## 2023-10-12 NOTE — ASSESSMENT & PLAN NOTE
Asymmetric bradykinesia, rest tremor and rigidity consistent with parkinsonism, most likely PD given slow progression. H&Y Stage 1 with nondominant left-sided symptoms. Overall has been functioning well off medications. He has increased his overall exercise and has been keeping a healthy diet and taking supplements. Spent discussing the clinical diagnosis of Parkinson's disease. Slow progression. We discussed potential additional studies that can be done which would support a diagnosis of parkinsonism such as alpha-synuclein skin biopsy versus DaTscan. At this time this would not . We discussed potential benefits of obtaining a MRI to rule out underlying structural vascular pathology given unilateral and very slow progression of symptoms. Will obtain MRI brain to rule out other underlying cause. We reviewed treatment options for early PD include :  -levodopa,  -dopamine agonists (ropinirole, pramipexole, Neupro patch)   -monoamine oxidase B (MAO-B) inhibitors (rasagiline, selegiline, [employer])    According to recent AAN guideline it sis reported that levodopa provides superior benefit at reducing motor symptoms when compared to treatment with either dopamine agonists or MAO-B inhibitors. However-  -Initial treatment with levodopa is more likely to cause dyskinesia--involuntary movements during the first five years of treatment so it is recommended to try to remain on lowest effective dose. -Dopamine agonist are more likely to cause impulse-control disorders such as compulsive gambling, eating, shopping or sexual activity, as well as hallucinations and excessive daytime sedation. -MAO-B inhibitors take longer to see the effect and in the studies patients were more likely to require additional therapy within two to three years. I    He will contact the office should he wish to start medication prior to next visit.   Given his mild symptoms I would consider starting a monoamine [Mother] : mother oxidase inhibitor.

## 2023-10-13 ENCOUNTER — TELEPHONE (OUTPATIENT)
Dept: NEUROLOGY | Facility: CLINIC | Age: 63
End: 2023-10-13

## 2023-10-13 NOTE — TELEPHONE ENCOUNTER
Patient called wants a reason for why he is getting an MRI. Medicare will not cover it unless its required. Please provide a reason for it.  Please send the order through My Chart

## 2023-10-30 ENCOUNTER — TELEPHONE (OUTPATIENT)
Dept: NEUROLOGY | Facility: CLINIC | Age: 63
End: 2023-10-30

## 2023-10-30 ENCOUNTER — HOSPITAL ENCOUNTER (OUTPATIENT)
Dept: MRI IMAGING | Facility: HOSPITAL | Age: 63
Discharge: HOME | End: 2023-10-30
Attending: PSYCHIATRY & NEUROLOGY
Payer: MEDICARE

## 2023-10-30 DIAGNOSIS — G20.A1 PARKINSON DISEASE: ICD-10-CM

## 2023-10-30 PROCEDURE — G1004 CDSM NDSC: HCPCS

## 2023-10-30 PROCEDURE — 70551 MRI BRAIN STEM W/O DYE: CPT

## 2023-10-30 NOTE — TELEPHONE ENCOUNTER
received  from 10/27 at 11:48am- This is emily seo. Uh, I have requested a script for my brain scan. I have this Monday over at the [facility]. If you could give me a call back, I'd like to see if I can get that script put up into my, my charts. So I have it also secondarily, although important, I want to make sure that my brain scan is covered by medicare. So if you could look at the code, I believe it's [phone], and make sure that it's a required brain scan so that it gets covered.  Thank you.  [phone]  ------------------------------------------------------------------  Per chart, pt completed MRI this am.

## 2023-10-30 NOTE — TELEPHONE ENCOUNTER
Jose Nevarez called in today to reschedule an appt that he cancelled by accident via [employer] on 02/16/24 at 1am w/Jb in [place]. Advised I was able to select the same slot as before because it was till open. Pt was very happy to hear that. End of call.

## 2023-11-14 DIAGNOSIS — N52.9 ERECTILE DYSFUNCTION, UNSPECIFIED ERECTILE DYSFUNCTION TYPE: ICD-10-CM

## 2023-11-14 RX ORDER — SILDENAFIL 50 MG/1
50 TABLET, FILM COATED ORAL AS NEEDED
Qty: 30 TABLET | Refills: 5 | Status: SHIPPED | OUTPATIENT
Start: 2023-11-14

## 2023-12-14 DIAGNOSIS — I10 ESSENTIAL HYPERTENSION: ICD-10-CM

## 2023-12-14 RX ORDER — AMLODIPINE BESYLATE AND BENAZEPRIL HYDROCHLORIDE 5; 10 MG/1; MG/1
CAPSULE ORAL
Qty: 90 CAPSULE | Refills: 0 | Status: SHIPPED | OUTPATIENT
Start: 2023-12-14

## 2024-02-19 ENCOUNTER — TELEPHONE (OUTPATIENT)
Dept: NEUROLOGY | Facility: CLINIC | Age: 64
End: 2024-02-19

## 2024-02-19 NOTE — TELEPHONE ENCOUNTER
Spoke with patient & informed him that provider called out today and that someone will call him back to reschedule.

## 2024-02-20 ENCOUNTER — TELEPHONE (OUTPATIENT)
Dept: NEUROLOGY | Facility: CLINIC | Age: 64
End: 2024-02-20

## 2024-02-20 NOTE — TELEPHONE ENCOUNTER
Called pt and spoke to Fawad in regards to converting appt to virtual or to move appt to another date. Patient stated he would like in office visit to which I offered him the next available which was on 04/24/2024 at 1:00 PM. Patient agreed to the appt and asked to be called if anything sooner should open up.

## 2024-02-20 NOTE — TELEPHONE ENCOUNTER
Patient called and will only take virtual visit if nothing else is available sooner than June.  He would really like to keep an in-person appointment.  Please call him back to discuss as soon as you can.  For now, his appointment isn't canceled for 2/21 @ 11:30.

## 2024-02-20 NOTE — TELEPHONE ENCOUNTER
Called patient and left message informing him that Dr. Muhammad will not be in office tomorrow, 2/21. He can still have his appointment, it would just need to be changed to a virtual appointment. Patient just needs to have video capabilities on a computer or phone.     Please call [phone] to let us know if this would be possible.

## 2024-03-14 DIAGNOSIS — I10 ESSENTIAL HYPERTENSION: ICD-10-CM

## 2024-03-14 RX ORDER — AMLODIPINE BESYLATE AND BENAZEPRIL HYDROCHLORIDE 5; 10 MG/1; MG/1
CAPSULE ORAL
Qty: 90 CAPSULE | Refills: 1 | Status: SHIPPED | OUTPATIENT
Start: 2024-03-14

## 2024-03-15 DIAGNOSIS — N52.9 ERECTILE DYSFUNCTION, UNSPECIFIED ERECTILE DYSFUNCTION TYPE: ICD-10-CM

## 2024-03-16 RX ORDER — SILDENAFIL 50 MG/1
50 TABLET, FILM COATED ORAL AS NEEDED
Qty: 30 TABLET | Refills: 0 | Status: SHIPPED | OUTPATIENT
Start: 2024-03-16

## 2024-04-24 ENCOUNTER — OFFICE VISIT (OUTPATIENT)
Dept: NEUROLOGY | Facility: CLINIC | Age: 64
End: 2024-04-24
Payer: MEDICARE

## 2024-04-24 VITALS — SYSTOLIC BLOOD PRESSURE: 136 MMHG | WEIGHT: 194 LBS | BODY MASS INDEX: 27.4 KG/M2 | DIASTOLIC BLOOD PRESSURE: 72 MMHG

## 2024-04-24 DIAGNOSIS — N52.9 ERECTILE DYSFUNCTION, UNSPECIFIED ERECTILE DYSFUNCTION TYPE: ICD-10-CM

## 2024-04-24 DIAGNOSIS — G20.A1 PARKINSON DISEASE: Primary | ICD-10-CM

## 2024-04-24 PROCEDURE — 99214 OFFICE O/P EST MOD 30 MIN: CPT | Performed by: PSYCHIATRY & NEUROLOGY

## 2024-04-24 NOTE — ASSESSMENT & PLAN NOTE
Asymmetric bradykinesia, rest tremor and rigidity consistent with parkinsonism, most likely PD given slow progression.  H&Y Stage 1 with nondominant left-sided symptoms.  He has opted to remain off any medications for PD and has has been functioning well focusing on exercise and diet . No longer on multiple supplements.    Since last seen there may be mild increase in tremor.  Once again discussed treatment options for early PD including  -levodopa,  -dopamine agonists (ropinirole, pramipexole, Neupro patch)   -monoamine oxidase B (MAO-B) inhibitors (rasagiline, selegiline, Xad[employer])    Given he is functioning well off medications and continues to look at early PD trials, he wishes to remain off medication which is reasonable given mild symptoms. He will be moving in June and has an appointment with a neurologist in [place].

## 2024-04-24 NOTE — PROGRESS NOTES
Review of Systems   Constitutional:  Negative for appetite change, fatigue and fever.   HENT: Negative.  Negative for hearing loss, tinnitus, trouble swallowing and voice change.    Eyes: Negative.  Negative for photophobia, pain and visual disturbance.   Respiratory: Negative.  Negative for shortness of breath.    Cardiovascular: Negative.  Negative for palpitations.   Gastrointestinal: Negative.  Negative for nausea and vomiting.   Endocrine: Negative.  Negative for cold intolerance.   Genitourinary: Negative.  Negative for dysuria, frequency and urgency.   Musculoskeletal: Negative.  Negative for back pain, gait problem, myalgias, neck pain and neck stiffness.   Skin: Negative.  Negative for rash.   Allergic/Immunologic: Negative.    Neurological:  Positive for tremors (Left Arm / Hand, Has gotten worse). Negative for dizziness, seizures, syncope, facial asymmetry, speech difficulty, weakness, light-headedness, numbness and headaches.   Hematological: Negative.  Does not bruise/bleed easily.   Psychiatric/Behavioral: Negative.  Negative for confusion, hallucinations and sleep disturbance.    All other systems reviewed and are negative.

## 2024-04-24 NOTE — PROGRESS NOTES
Patient ID: Fawad Aranda is a 64 y.o. male    Assessment/Plan:    Parkinson disease  Asymmetric bradykinesia, rest tremor and rigidity consistent with parkinsonism, most likely PD given slow progression.  H&Y Stage 1 with nondominant left-sided symptoms.  He has opted to remain off any medications for PD and has has been functioning well focusing on exercise and diet . No longer on multiple supplements.    Since last seen there may be mild increase in tremor.  Once again discussed treatment options for early PD including  -levodopa,  -dopamine agonists (ropinirole, pramipexole, Neupro patch)   -monoamine oxidase B (MAO-B) inhibitors (rasagiline, selegiline, Xadago)    Given he is functioning well off medications and continues to look at early PD trials, he wishes to remain off medication which is reasonable given mild symptoms. He will be moving in June and has an appointment with a neurologist in [place].     Erectile dysfunction  Questions regarding Rugiet as this contains apomorphine. Would not be contraindicated in PD.       Diagnoses and all orders for this visit:    Parkinson disease    Erectile dysfunction, unspecified erectile dysfunction type    Other orders  -     Cetirizine HCl (ZYRTEC ALLERGY PO); Take by mouth        Subjective:      Fawad Aranda is a 64 year old RH man with Parkinson disease who presents for movement disorder follow up.   To review, symptom onset around 2020 with left leg pain followed by reduction in left hand dexterity and rest tremor. 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  He was diagnosed by Dr. Cuellar and opted to remain off medications.     Initially seen 10/2023 with asymmetric bradykinesia, rest tremor and rigidity consistent with parkinsonism, most likely PD given slow progression.  H&Y Stage 1 with nondominant left-sided symptoms. Taking vitamin supplements including B12 and B1 and nutritional supplements as breakfast.  Three  days prior started taking flow state Promix supplement 20 minutes prior to activity up to twice daily (contains mucina puriens in additional to other vitamins). No clear change in symptoms.  Overall has been functioning well off medications with exercise. Reviewed medication options for PD but he opted to remain off.   Moving to [place] June 2024.      Left hand rest tremor is more frequent.  He use to run 3 miles a day but was finding his left leg would occasionally scuff so now he is walking 3 miles a day for safety.    He is in a functional exercise program with . He does stretching and balance , HIT. He does notice that he can slow down.   He has been trying to keep a healthier diet. No longer on Promix.   Has ED and has questions regarding medication Ruteria as it has apomorphine.       Speech is unchanged.  There is no drooling. No difficulty chewing and swallowing.  No trouble with   Sleeps well. He can awaken with discomfort in the left shoulder but can go back to sleep. He  is working on exercises to strengthen . Aleve helps.      There are no issues with urination. Mild constipation on occasion.   There are no experiences with lightheadedness on standing.   Cognition is unchanged. Mild word finding difficulty.                 Objective:    /72 (BP Location: Left arm, Patient Position: Standing, Cuff Size: Standard)   Wt 88 kg (194 lb)   BMI 27.40 kg/m²       Physical Exam  Vitals reviewed.   Eyes:      Extraocular Movements: Extraocular movements intact.      Pupils: Pupils are equal, round, and reactive to light.   Neurological:      Mental Status: He is alert.      Motor: Motor strength is normal.  Psychiatric:         Speech: Speech normal.         Neurological Exam  Mental Status  Alert. Oriented to person, place, time and situation. Speech is normal. Language is fluent with no aphasia. Attention and concentration are normal.    Cranial Nerves  CN III, IV, VI: Extraocular movements  intact bilaterally. Pupils equal round and reactive to light bilaterally.  CN VII: Full and symmetric facial movement.  CN VIII: Hearing is normal.  CN IX, X: Palate elevates symmetrically  CN XI: Shoulder shrug strength is normal.  CN XII: Tongue midline without atrophy or fasciculations.    Motor   Increased muscle tone. LUE with augmentation. Strength is 5/5 throughout all four extremities.    Sensory  Light touch is normal in upper and lower extremities.     Coordination    See motor UPDRS  Rest tremor of left hand  Mild action and postural tremor on left. Slight postural on right with hand outstretched  Mild decrement on left .    Gait  Casual gait: Able to rise from chair without using arms.  Reduced left arm swing  .     UPDRS III                              4/24/24   Time since last dose:    Speech  0   Facial Expression  1   Rigidity - Neck  1   Rigidity - Upper Extremity (R)  0   Rigidity - Upper Extremity (L)   1   Rigidity - Lower Extremity (R)  0   Rigidity - Lower Extremity (L)   0   Finger Taps (R)   0   Finger Taps (L)   1   Hand Movement (R)  0   Hand Movement (L)   0   Pronation/Supination (R)  0   Pronation/Supination (L)   2   Toe Tapping (R) 0   Toe Tapping (L) 0   Leg Agility (R)  0   Leg Agility (L)   0   Arising from Chair   0   Gait   1   Freezing of Gait 0   Postural Stability   0   Posture 1   Global spontaneity of movement 0   Postural Tremor (Ri 1   Postural Tremor (L) 2   Kinetic Tremor (R)  0   Kinetic Tremor (L)  1   Rest tremor amplitude RUE 0   Rest tremor amplitude LUE 2   Rest tremor amplitude RLE 0   Reset tremor amplitude LLE 0   Lip/Jaw Tremor  0   Consistency of tremor 2   Motor Exam Total:                 Zandra Figueroa MD  Movement disorder physician  [facility]

## 2024-05-31 DIAGNOSIS — I10 ESSENTIAL HYPERTENSION: ICD-10-CM

## 2024-05-31 DIAGNOSIS — N52.9 ERECTILE DYSFUNCTION, UNSPECIFIED ERECTILE DYSFUNCTION TYPE: ICD-10-CM

## 2024-06-01 NOTE — TELEPHONE ENCOUNTER
Refill must be reviewed and completed by the office or provider. The refill is unable to be approved or denied by the medication management team.      Last seen 06.05.2023 no appointment - Please review to see if the refill is appropriate.

## 2024-06-01 NOTE — TELEPHONE ENCOUNTER
Refill must be reviewed and completed by the office or provider. The refill is unable to be approved or denied by the medication management team.      Last seen 06.05.2023 - Please review to see if the refill is appropriate.

## 2024-06-03 RX ORDER — AMLODIPINE BESYLATE AND BENAZEPRIL HYDROCHLORIDE 5; 10 MG/1; MG/1
1 CAPSULE ORAL DAILY
Qty: 90 CAPSULE | Refills: 0 | Status: SHIPPED | OUTPATIENT
Start: 2024-06-03

## 2024-06-03 RX ORDER — SILDENAFIL 50 MG/1
50 TABLET, FILM COATED ORAL AS NEEDED
Qty: 30 TABLET | Refills: 0 | Status: SHIPPED | OUTPATIENT
Start: 2024-06-03

## 2024-07-11 ENCOUNTER — TELEPHONE (OUTPATIENT)
Age: 64
End: 2024-07-11

## 2024-07-11 DIAGNOSIS — N52.9 ERECTILE DYSFUNCTION, UNSPECIFIED ERECTILE DYSFUNCTION TYPE: ICD-10-CM

## 2024-07-11 DIAGNOSIS — I10 ESSENTIAL HYPERTENSION: ICD-10-CM

## 2024-07-11 RX ORDER — AMLODIPINE BESYLATE AND BENAZEPRIL HYDROCHLORIDE 5; 10 MG/1; MG/1
1 CAPSULE ORAL DAILY
Qty: 90 CAPSULE | Refills: 0 | Status: SHIPPED | OUTPATIENT
Start: 2024-07-11

## 2024-07-11 RX ORDER — SILDENAFIL 50 MG/1
50 TABLET, FILM COATED ORAL AS NEEDED
Qty: 30 TABLET | Refills: 0 | Status: SHIPPED | OUTPATIENT
Start: 2024-07-11

## 2024-07-11 NOTE — TELEPHONE ENCOUNTER
Patient called would like to know if Sildenafil and Lotrel 5/10 can be sent to [facility] on [address]. [facility] [address]. Phone#[phone] Does not have appointment with new PCP until 2/28/2025. Would like a call when sent in or if there is any issues

## 2024-11-26 ENCOUNTER — TELEPHONE (OUTPATIENT)
Dept: INTERNAL MEDICINE CLINIC | Facility: CLINIC | Age: 64
End: 2024-11-26

## 2025-03-27 ENCOUNTER — DOCUMENTATION (OUTPATIENT)
Dept: ADMINISTRATIVE | Facility: OTHER | Age: 65
End: 2025-03-27

## 2025-03-27 NOTE — PROGRESS NOTES
Patient contacted to schedule Annual Wellness Visit.   Patient transferred care out of network.     Per chart, patient has moved to [place].     Thank you.  Diann Lindsay MA  [facility]

## 2025-03-30 NOTE — PROGRESS NOTES
03/29/25 11:45 PM        The office's request has been received, reviewed, and the patient chart updated. The PCP has successfully been removed with a patient attribution note. This message will now be completed.        Thank you  Prabhjot Renae